# Patient Record
Sex: FEMALE | Race: WHITE | NOT HISPANIC OR LATINO | Employment: OTHER | URBAN - METROPOLITAN AREA
[De-identification: names, ages, dates, MRNs, and addresses within clinical notes are randomized per-mention and may not be internally consistent; named-entity substitution may affect disease eponyms.]

---

## 2017-01-26 ENCOUNTER — HISTORICAL (OUTPATIENT)
Dept: ANESTHESIOLOGY | Facility: HOSPITAL | Age: 67
End: 2017-01-26

## 2017-02-03 ENCOUNTER — HISTORICAL (OUTPATIENT)
Dept: RADIOLOGY | Facility: HOSPITAL | Age: 67
End: 2017-02-03

## 2017-09-20 ENCOUNTER — HISTORICAL (OUTPATIENT)
Dept: ADMINISTRATIVE | Facility: HOSPITAL | Age: 67
End: 2017-09-20

## 2018-02-28 ENCOUNTER — HISTORICAL (OUTPATIENT)
Dept: LAB | Facility: HOSPITAL | Age: 68
End: 2018-02-28

## 2018-02-28 LAB
ALBUMIN SERPL-MCNC: 4.3 GM/DL (ref 3.4–5)
ALBUMIN/GLOB SERPL: 1 {RATIO}
ALP SERPL-CCNC: 75 UNIT/L (ref 45–117)
ALT SERPL-CCNC: 61 UNIT/L (ref 13–56)
AST SERPL-CCNC: 71 UNIT/L (ref 15–37)
BILIRUB SERPL-MCNC: 0.3 MG/DL (ref 0.2–1)
BILIRUBIN DIRECT+TOT PNL SERPL-MCNC: 0.1 MG/DL (ref 0–0.2)
BILIRUBIN DIRECT+TOT PNL SERPL-MCNC: 0.2 MG/DL (ref 0–1)
BUN SERPL-MCNC: 10 MG/DL (ref 7–18)
CALCIUM SERPL-MCNC: 9.2 MG/DL (ref 8.5–10.1)
CHLORIDE SERPL-SCNC: 106 MMOL/L (ref 98–107)
CHOLEST SERPL-MCNC: 275 MG/DL (ref 0–200)
CHOLEST/HDLC SERPL: 4.2 {RATIO} (ref 0–4)
CO2 SERPL-SCNC: 29 MMOL/L (ref 21–32)
CREAT SERPL-MCNC: 0.74 MG/DL (ref 0.55–1.02)
GLOBULIN SER-MCNC: 4 GM/DL (ref 2–4)
GLUCOSE SERPL-MCNC: 83 MG/DL (ref 74–106)
HDLC SERPL-MCNC: 66 MG/DL (ref 35–60)
LDLC SERPL CALC-MCNC: 181 MG/DL (ref 0–129)
POTASSIUM SERPL-SCNC: 4.4 MMOL/L (ref 3.5–5.1)
PROT SERPL-MCNC: 8 GM/DL (ref 6.4–8.2)
SODIUM SERPL-SCNC: 141 MMOL/L (ref 136–145)
TRIGL SERPL-MCNC: 142 MG/DL (ref 30–150)
VLDLC SERPL CALC-MCNC: 28 MG/DL

## 2018-04-18 ENCOUNTER — HISTORICAL (OUTPATIENT)
Dept: ADMINISTRATIVE | Facility: HOSPITAL | Age: 68
End: 2018-04-18

## 2018-04-18 LAB
INFLUENZA A ANTIGEN, POC: NEGATIVE
RAPID GROUP A STREP (OHS): NEGATIVE

## 2019-03-20 ENCOUNTER — HISTORICAL (OUTPATIENT)
Dept: LAB | Facility: HOSPITAL | Age: 69
End: 2019-03-20

## 2019-03-20 LAB
ABS NEUT (OLG): 4.57 X10(3)/MCL (ref 2.1–9.2)
ALBUMIN SERPL-MCNC: 4.7 GM/DL (ref 3.4–5)
ALBUMIN/GLOB SERPL: 1.2 {RATIO}
ALP SERPL-CCNC: 70 UNIT/L (ref 45–117)
ALT SERPL-CCNC: 36 UNIT/L (ref 13–56)
AST SERPL-CCNC: 34 UNIT/L (ref 15–37)
BILIRUB SERPL-MCNC: 0.4 MG/DL (ref 0.2–1)
BILIRUBIN DIRECT+TOT PNL SERPL-MCNC: 0.1 MG/DL (ref 0–0.2)
BILIRUBIN DIRECT+TOT PNL SERPL-MCNC: 0.3 MG/DL (ref 0–1)
BUN SERPL-MCNC: 18 MG/DL (ref 7–18)
CALCIUM SERPL-MCNC: 9.7 MG/DL (ref 8.5–10.1)
CHLORIDE SERPL-SCNC: 104 MMOL/L (ref 98–107)
CO2 SERPL-SCNC: 30 MMOL/L (ref 21–32)
CREAT SERPL-MCNC: 0.87 MG/DL (ref 0.55–1.02)
ERYTHROCYTE [DISTWIDTH] IN BLOOD BY AUTOMATED COUNT: 13.6 % (ref 11.5–17)
GLOBULIN SER-MCNC: 4 GM/DL (ref 2–4)
GLUCOSE SERPL-MCNC: 116 MG/DL (ref 74–106)
HCT VFR BLD AUTO: 48.4 % (ref 37–47)
HGB BLD-MCNC: 15.5 GM/DL (ref 12–16)
MCH RBC QN AUTO: 32.7 PG (ref 27–31)
MCHC RBC AUTO-ENTMCNC: 32 GM/DL (ref 33–36)
MCV RBC AUTO: 102.1 FL (ref 80–94)
PLATELET # BLD AUTO: 263 X10(3)/MCL (ref 130–400)
PMV BLD AUTO: 9.8 FL (ref 7.4–10.4)
POTASSIUM SERPL-SCNC: 4.5 MMOL/L (ref 3.5–5.1)
PROT SERPL-MCNC: 8.5 GM/DL (ref 6.4–8.2)
RBC # BLD AUTO: 4.74 X10(6)/MCL (ref 4.2–5.4)
SODIUM SERPL-SCNC: 140 MMOL/L (ref 136–145)
WBC # SPEC AUTO: 7.7 X10(3)/MCL (ref 4.5–11.5)

## 2019-04-26 ENCOUNTER — HISTORICAL (OUTPATIENT)
Dept: RADIATION THERAPY | Facility: HOSPITAL | Age: 69
End: 2019-04-26

## 2019-05-08 ENCOUNTER — HISTORICAL (OUTPATIENT)
Dept: RADIATION THERAPY | Facility: HOSPITAL | Age: 69
End: 2019-05-08

## 2019-05-13 ENCOUNTER — HISTORICAL (OUTPATIENT)
Dept: RADIATION THERAPY | Facility: HOSPITAL | Age: 69
End: 2019-05-13

## 2019-05-15 ENCOUNTER — HISTORICAL (OUTPATIENT)
Dept: RADIATION THERAPY | Facility: HOSPITAL | Age: 69
End: 2019-05-15

## 2019-05-16 ENCOUNTER — HISTORICAL (OUTPATIENT)
Dept: RADIATION THERAPY | Facility: HOSPITAL | Age: 69
End: 2019-05-16

## 2019-05-17 ENCOUNTER — HISTORICAL (OUTPATIENT)
Dept: RADIATION THERAPY | Facility: HOSPITAL | Age: 69
End: 2019-05-17

## 2019-05-20 ENCOUNTER — HISTORICAL (OUTPATIENT)
Dept: RADIATION THERAPY | Facility: HOSPITAL | Age: 69
End: 2019-05-20

## 2019-05-21 ENCOUNTER — HISTORICAL (OUTPATIENT)
Dept: RADIATION THERAPY | Facility: HOSPITAL | Age: 69
End: 2019-05-21

## 2019-05-22 ENCOUNTER — HISTORICAL (OUTPATIENT)
Dept: RADIATION THERAPY | Facility: HOSPITAL | Age: 69
End: 2019-05-22

## 2019-05-23 ENCOUNTER — HISTORICAL (OUTPATIENT)
Dept: RADIATION THERAPY | Facility: HOSPITAL | Age: 69
End: 2019-05-23

## 2019-05-24 ENCOUNTER — HISTORICAL (OUTPATIENT)
Dept: RADIATION THERAPY | Facility: HOSPITAL | Age: 69
End: 2019-05-24

## 2019-05-28 ENCOUNTER — HISTORICAL (OUTPATIENT)
Dept: RADIATION THERAPY | Facility: HOSPITAL | Age: 69
End: 2019-05-28

## 2019-05-29 ENCOUNTER — HISTORICAL (OUTPATIENT)
Dept: RADIATION THERAPY | Facility: HOSPITAL | Age: 69
End: 2019-05-29

## 2019-05-30 ENCOUNTER — HISTORICAL (OUTPATIENT)
Dept: RADIATION THERAPY | Facility: HOSPITAL | Age: 69
End: 2019-05-30

## 2019-05-31 ENCOUNTER — HISTORICAL (OUTPATIENT)
Dept: RADIATION THERAPY | Facility: HOSPITAL | Age: 69
End: 2019-05-31

## 2019-06-03 ENCOUNTER — HISTORICAL (OUTPATIENT)
Dept: RADIATION THERAPY | Facility: HOSPITAL | Age: 69
End: 2019-06-03

## 2019-06-04 ENCOUNTER — HISTORICAL (OUTPATIENT)
Dept: RADIATION THERAPY | Facility: HOSPITAL | Age: 69
End: 2019-06-04

## 2019-06-05 ENCOUNTER — HISTORICAL (OUTPATIENT)
Dept: RADIATION THERAPY | Facility: HOSPITAL | Age: 69
End: 2019-06-05

## 2019-06-06 ENCOUNTER — HISTORICAL (OUTPATIENT)
Dept: RADIATION THERAPY | Facility: HOSPITAL | Age: 69
End: 2019-06-06

## 2019-06-07 ENCOUNTER — HISTORICAL (OUTPATIENT)
Dept: RADIATION THERAPY | Facility: HOSPITAL | Age: 69
End: 2019-06-07

## 2019-06-10 ENCOUNTER — HISTORICAL (OUTPATIENT)
Dept: RADIATION THERAPY | Facility: HOSPITAL | Age: 69
End: 2019-06-10

## 2019-06-11 ENCOUNTER — HISTORICAL (OUTPATIENT)
Dept: RADIATION THERAPY | Facility: HOSPITAL | Age: 69
End: 2019-06-11

## 2019-06-12 ENCOUNTER — HISTORICAL (OUTPATIENT)
Dept: RADIATION THERAPY | Facility: HOSPITAL | Age: 69
End: 2019-06-12

## 2019-09-17 ENCOUNTER — HISTORICAL (OUTPATIENT)
Dept: RADIATION THERAPY | Facility: HOSPITAL | Age: 69
End: 2019-09-17

## 2019-09-23 ENCOUNTER — HISTORICAL (OUTPATIENT)
Dept: ADMINISTRATIVE | Facility: HOSPITAL | Age: 69
End: 2019-09-23

## 2019-09-23 LAB
ABS NEUT (OLG): 2.65 X10(3)/MCL (ref 2.1–9.2)
ALBUMIN SERPL-MCNC: 3.9 GM/DL (ref 3.4–5)
ALP SERPL-CCNC: 68 UNIT/L (ref 38–126)
ALT SERPL-CCNC: 27 UNIT/L (ref 12–78)
ANION GAP SERPL CALC-SCNC: 13 MMOL/L
AST SERPL-CCNC: 29 UNIT/L (ref 15–37)
BASOPHILS # BLD AUTO: 0 X10(3)/MCL (ref 0–0.2)
BASOPHILS NFR BLD AUTO: 0.2 %
BILIRUB SERPL-MCNC: 0.3 MG/DL (ref 0.2–1)
BILIRUBIN DIRECT+TOT PNL SERPL-MCNC: 0.1 MG/DL (ref 0–0.5)
BILIRUBIN DIRECT+TOT PNL SERPL-MCNC: 0.2 MG/DL (ref 0–0.8)
BUN SERPL-MCNC: 17 MG/DL (ref 8–26)
CHLORIDE SERPL-SCNC: 107 MMOL/L (ref 98–109)
CREAT SERPL-MCNC: 0.8 MG/DL (ref 0.6–1.3)
EOSINOPHIL # BLD AUTO: 0.1 X10(3)/MCL (ref 0–0.9)
EOSINOPHIL NFR BLD AUTO: 1.5 %
ERYTHROCYTE [DISTWIDTH] IN BLOOD BY AUTOMATED COUNT: 13.5 % (ref 11.5–17)
GLUCOSE SERPL-MCNC: 100 MG/DL (ref 70–105)
HCT VFR BLD AUTO: 44.6 % (ref 37–47)
HCT VFR BLD CALC: 43 % (ref 38–51)
HGB BLD-MCNC: 13.9 GM/DL (ref 12–16)
HGB BLD-MCNC: 14.6 MG/DL (ref 12–17)
LIVER PROFILE INTERP: NORMAL
LYMPHOCYTES # BLD AUTO: 1.6 X10(3)/MCL (ref 0.6–4.6)
LYMPHOCYTES NFR BLD AUTO: 34.9 %
MCH RBC QN AUTO: 32 PG (ref 27–31)
MCHC RBC AUTO-ENTMCNC: 31.2 GM/DL (ref 33–36)
MCV RBC AUTO: 102.5 FL (ref 80–94)
MONOCYTES # BLD AUTO: 0.3 X10(3)/MCL (ref 0.1–1.3)
MONOCYTES NFR BLD AUTO: 6.4 %
NEUTROPHILS # BLD AUTO: 2.6 X10(3)/MCL (ref 2.1–9.2)
NEUTROPHILS NFR BLD AUTO: 56.8 %
PLATELET # BLD AUTO: 213 X10(3)/MCL (ref 130–400)
PMV BLD AUTO: 9.9 FL (ref 9.4–12.4)
POC IONIZED CALCIUM: 1.12 MMOL/L (ref 1.12–1.32)
POC TCO2: 25 MMOL/L (ref 24–29)
POTASSIUM BLD-SCNC: 3.9 MMOL/L (ref 3.5–4.9)
PROT SERPL-MCNC: 6.8 GM/DL (ref 6.4–8.2)
RBC # BLD AUTO: 4.35 X10(6)/MCL (ref 4.2–5.4)
SODIUM BLD-SCNC: 141 MMOL/L (ref 138–146)
WBC # SPEC AUTO: 4.7 X10(3)/MCL (ref 4.5–11.5)

## 2020-01-06 ENCOUNTER — HISTORICAL (OUTPATIENT)
Dept: ADMINISTRATIVE | Facility: HOSPITAL | Age: 70
End: 2020-01-06

## 2020-01-06 LAB
ABS NEUT (OLG): 3.25 X10(3)/MCL (ref 2.1–9.2)
ALBUMIN SERPL-MCNC: 4 GM/DL (ref 3.4–5)
ALP SERPL-CCNC: 80 UNIT/L (ref 38–126)
ALT SERPL-CCNC: 27 UNIT/L (ref 12–78)
ANION GAP SERPL CALC-SCNC: 13 MMOL/L
AST SERPL-CCNC: 21 UNIT/L (ref 15–37)
BASOPHILS # BLD AUTO: 0 X10(3)/MCL (ref 0–0.2)
BASOPHILS NFR BLD AUTO: 0.2 %
BILIRUB SERPL-MCNC: 0.6 MG/DL (ref 0.2–1)
BILIRUBIN DIRECT+TOT PNL SERPL-MCNC: 0.1 MG/DL (ref 0–0.2)
BILIRUBIN DIRECT+TOT PNL SERPL-MCNC: 0.5 MG/DL (ref 0–0.8)
BUN SERPL-MCNC: 18 MG/DL (ref 8–26)
CHLORIDE SERPL-SCNC: 104 MMOL/L (ref 98–109)
CREAT SERPL-MCNC: 0.9 MG/DL (ref 0.6–1.3)
EOSINOPHIL # BLD AUTO: 0.1 X10(3)/MCL (ref 0–0.9)
EOSINOPHIL NFR BLD AUTO: 1.8 %
ERYTHROCYTE [DISTWIDTH] IN BLOOD BY AUTOMATED COUNT: 13 % (ref 11.5–17)
GLUCOSE SERPL-MCNC: 69 MG/DL (ref 70–105)
HCT VFR BLD AUTO: 45.6 % (ref 37–47)
HCT VFR BLD CALC: 44 % (ref 38–51)
HGB BLD-MCNC: 14.3 GM/DL (ref 12–16)
HGB BLD-MCNC: 15 MG/DL (ref 12–17)
LIVER PROFILE INTERP: NORMAL
LYMPHOCYTES # BLD AUTO: 1.7 X10(3)/MCL (ref 0.6–4.6)
LYMPHOCYTES NFR BLD AUTO: 30.7 %
MCH RBC QN AUTO: 31.8 PG (ref 27–31)
MCHC RBC AUTO-ENTMCNC: 31.4 GM/DL (ref 33–36)
MCV RBC AUTO: 101.6 FL (ref 80–94)
MONOCYTES # BLD AUTO: 0.4 X10(3)/MCL (ref 0.1–1.3)
MONOCYTES NFR BLD AUTO: 7.8 %
NEUTROPHILS # BLD AUTO: 3.2 X10(3)/MCL (ref 2.1–9.2)
NEUTROPHILS NFR BLD AUTO: 59.3 %
PLATELET # BLD AUTO: 260 X10(3)/MCL (ref 130–400)
PMV BLD AUTO: 9.4 FL (ref 9.4–12.4)
POC IONIZED CALCIUM: 1.12 MMOL/L (ref 1.12–1.32)
POC TCO2: 29 MMOL/L (ref 24–29)
POTASSIUM BLD-SCNC: 4.2 MMOL/L (ref 3.5–4.9)
PROT SERPL-MCNC: 7 GM/DL (ref 6.4–8.2)
RBC # BLD AUTO: 4.49 X10(6)/MCL (ref 4.2–5.4)
SODIUM BLD-SCNC: 140 MMOL/L (ref 138–146)
WBC # SPEC AUTO: 5.5 X10(3)/MCL (ref 4.5–11.5)

## 2020-05-15 ENCOUNTER — HISTORICAL (OUTPATIENT)
Dept: LAB | Facility: HOSPITAL | Age: 70
End: 2020-05-15

## 2020-05-15 LAB
ALBUMIN SERPL-MCNC: 4.2 GM/DL (ref 3.4–4.8)
ALP SERPL-CCNC: 89 UNIT/L (ref 40–150)
ALT SERPL-CCNC: 26 UNIT/L (ref 0–55)
AST SERPL-CCNC: 27 UNIT/L (ref 5–34)
BILIRUB SERPL-MCNC: 0.6 MG/DL (ref 0.2–1.2)
BILIRUBIN DIRECT+TOT PNL SERPL-MCNC: 0.2 MG/DL (ref 0–0.5)
BILIRUBIN DIRECT+TOT PNL SERPL-MCNC: 0.4 MG/DL (ref 0–0.8)
PROT SERPL-MCNC: 7.9 GM/DL (ref 5.8–7.6)

## 2020-07-08 ENCOUNTER — HISTORICAL (OUTPATIENT)
Dept: ADMINISTRATIVE | Facility: HOSPITAL | Age: 70
End: 2020-07-08

## 2020-07-08 LAB
ABS NEUT (OLG): 2.25 X10(3)/MCL (ref 2.1–9.2)
ALBUMIN SERPL-MCNC: 4.4 GM/DL (ref 3.4–4.8)
ALP SERPL-CCNC: 73 UNIT/L (ref 40–150)
ALT SERPL-CCNC: 19 UNIT/L (ref 0–55)
ANION GAP SERPL CALC-SCNC: 13 MMOL/L
AST SERPL-CCNC: 25 UNIT/L (ref 5–34)
BILIRUB SERPL-MCNC: 0.3 MG/DL
BILIRUBIN DIRECT+TOT PNL SERPL-MCNC: 0.1 MG/DL (ref 0–0.5)
BILIRUBIN DIRECT+TOT PNL SERPL-MCNC: 0.2 MG/DL (ref 0–0.8)
BUN SERPL-MCNC: 21 MG/DL (ref 8–26)
CHLORIDE SERPL-SCNC: 105 MMOL/L (ref 98–109)
CREAT SERPL-MCNC: 0.5 MG/DL (ref 0.6–1.3)
EOSINOPHIL # BLD AUTO: 0.1 X10(3)/MCL (ref 0–0.9)
EOSINOPHIL NFR BLD AUTO: 2.6 %
ERYTHROCYTE [DISTWIDTH] IN BLOOD BY AUTOMATED COUNT: 12.2 % (ref 11.5–17)
GLUCOSE SERPL-MCNC: 96 MG/DL (ref 70–105)
HCT VFR BLD AUTO: 46.4 % (ref 37–47)
HCT VFR BLD CALC: 46 % (ref 38–51)
HGB BLD-MCNC: 14.7 GM/DL (ref 12–16)
HGB BLD-MCNC: 15.6 MG/DL (ref 12–17)
LIVER PROFILE INTERP: ABNORMAL
LYMPHOCYTES # BLD AUTO: 1.9 X10(3)/MCL (ref 0.6–4.6)
LYMPHOCYTES NFR BLD AUTO: 40.8 %
MCH RBC QN AUTO: 31.7 PG (ref 27–31)
MCHC RBC AUTO-ENTMCNC: 31.7 GM/DL (ref 33–36)
MCV RBC AUTO: 100 FL (ref 80–94)
MONOCYTES # BLD AUTO: 0.3 X10(3)/MCL (ref 0.1–1.3)
MONOCYTES NFR BLD AUTO: 7 %
NEUTROPHILS # BLD AUTO: 2.2 X10(3)/MCL (ref 2.1–9.2)
NEUTROPHILS NFR BLD AUTO: 49.2 %
PLATELET # BLD AUTO: 207 X10(3)/MCL (ref 130–400)
PMV BLD AUTO: 9.7 FL (ref 9.4–12.4)
POC IONIZED CALCIUM: 1.15 MMOL/L (ref 1.12–1.32)
POC TCO2: 27 MMOL/L (ref 24–29)
POTASSIUM BLD-SCNC: 4.3 MMOL/L (ref 3.5–4.9)
PROT SERPL-MCNC: 7.8 GM/DL (ref 5.8–7.6)
RBC # BLD AUTO: 4.64 X10(6)/MCL (ref 4.2–5.4)
SODIUM BLD-SCNC: 140 MMOL/L (ref 138–146)
WBC # SPEC AUTO: 4.6 X10(3)/MCL (ref 4.5–11.5)

## 2020-10-12 ENCOUNTER — HISTORICAL (OUTPATIENT)
Dept: ADMINISTRATIVE | Facility: HOSPITAL | Age: 70
End: 2020-10-12

## 2020-10-12 LAB
ABS NEUT (OLG): 1.92 X10(3)/MCL (ref 2.1–9.2)
ALBUMIN SERPL-MCNC: 4.7 GM/DL (ref 3.4–4.8)
ALBUMIN/GLOB SERPL: 1.7 RATIO (ref 1.1–2)
ALP SERPL-CCNC: 72 UNIT/L (ref 40–150)
ALT SERPL-CCNC: 25 UNIT/L (ref 0–55)
AST SERPL-CCNC: 50 UNIT/L (ref 5–34)
BASOPHILS # BLD AUTO: 0 X10(3)/MCL (ref 0–0.2)
BASOPHILS NFR BLD AUTO: 0.2 %
BILIRUB SERPL-MCNC: 0.6 MG/DL
BILIRUBIN DIRECT+TOT PNL SERPL-MCNC: 0.2 MG/DL (ref 0–0.5)
BILIRUBIN DIRECT+TOT PNL SERPL-MCNC: 0.4 MG/DL (ref 0–0.8)
BUN SERPL-MCNC: 18.5 MG/DL (ref 9.8–20.1)
CALCIUM SERPL-MCNC: 9 MG/DL (ref 8.4–10.2)
CHLORIDE SERPL-SCNC: 107 MMOL/L (ref 98–107)
CO2 SERPL-SCNC: 23 MMOL/L (ref 23–31)
CREAT SERPL-MCNC: 0.72 MG/DL (ref 0.55–1.02)
EOSINOPHIL # BLD AUTO: 0.1 X10(3)/MCL (ref 0–0.9)
EOSINOPHIL NFR BLD AUTO: 1.8 %
ERYTHROCYTE [DISTWIDTH] IN BLOOD BY AUTOMATED COUNT: 13.2 % (ref 11.5–17)
GLOBULIN SER-MCNC: 2.8 GM/DL (ref 2.4–3.5)
GLUCOSE SERPL-MCNC: 80 MG/DL (ref 82–115)
HCT VFR BLD AUTO: 45.4 % (ref 37–47)
HGB BLD-MCNC: 14.8 GM/DL (ref 12–16)
LYMPHOCYTES # BLD AUTO: 2.1 X10(3)/MCL (ref 0.6–4.6)
LYMPHOCYTES NFR BLD AUTO: 47.5 %
MCH RBC QN AUTO: 32.4 PG (ref 27–31)
MCHC RBC AUTO-ENTMCNC: 32.6 GM/DL (ref 33–36)
MCV RBC AUTO: 99.3 FL (ref 80–94)
MONOCYTES # BLD AUTO: 0.3 X10(3)/MCL (ref 0.1–1.3)
MONOCYTES NFR BLD AUTO: 7.4 %
NEUTROPHILS # BLD AUTO: 1.9 X10(3)/MCL (ref 2.1–9.2)
NEUTROPHILS NFR BLD AUTO: 43.1 %
PLATELET # BLD AUTO: 123 X10(3)/MCL (ref 130–400)
PMV BLD AUTO: 11 FL (ref 9.4–12.4)
POTASSIUM SERPL-SCNC: 4.6 MMOL/L (ref 3.5–5.1)
PROT SERPL-MCNC: 7.5 GM/DL (ref 5.8–7.6)
RBC # BLD AUTO: 4.57 X10(6)/MCL (ref 4.2–5.4)
SODIUM SERPL-SCNC: 143 MMOL/L (ref 136–145)
WBC # SPEC AUTO: 4.5 X10(3)/MCL (ref 4.5–11.5)

## 2021-03-18 ENCOUNTER — HISTORICAL (OUTPATIENT)
Dept: LAB | Facility: HOSPITAL | Age: 71
End: 2021-03-18

## 2021-03-18 LAB
ALBUMIN SERPL-MCNC: 4.7 GM/DL (ref 3.4–4.8)
ALBUMIN/GLOB SERPL: 1.4 RATIO (ref 1.1–2)
ALP SERPL-CCNC: 86 UNIT/L (ref 40–150)
ALT SERPL-CCNC: 22 UNIT/L (ref 0–55)
AST SERPL-CCNC: 26 UNIT/L (ref 5–34)
BILIRUB SERPL-MCNC: 0.5 MG/DL (ref 0.2–1.2)
BILIRUBIN DIRECT+TOT PNL SERPL-MCNC: 0.2 MG/DL (ref 0–0.5)
BILIRUBIN DIRECT+TOT PNL SERPL-MCNC: 0.3 MG/DL (ref 0–0.8)
BUN SERPL-MCNC: 19.2 MG/DL (ref 9.8–20.1)
CALCIUM SERPL-MCNC: 9.7 MG/DL (ref 8.4–10.2)
CHLORIDE SERPL-SCNC: 104 MMOL/L (ref 98–107)
CHOLEST SERPL-MCNC: 276 MG/DL
CHOLEST/HDLC SERPL: 4 {RATIO} (ref 0–5)
CO2 SERPL-SCNC: 27 MMOL/L (ref 23–31)
CREAT SERPL-MCNC: 0.97 MG/DL (ref 0.57–1.11)
GLOBULIN SER-MCNC: 3.4 GM/DL (ref 2.4–3.5)
GLUCOSE SERPL-MCNC: 104 MG/DL (ref 82–115)
HDLC SERPL-MCNC: 65 MG/DL (ref 40–60)
LDLC SERPL CALC-MCNC: 185 MG/DL (ref 50–140)
POTASSIUM SERPL-SCNC: 4.4 MMOL/L (ref 3.5–5.1)
PROT SERPL-MCNC: 8.1 GM/DL (ref 5.8–7.6)
SODIUM SERPL-SCNC: 139 MMOL/L (ref 136–145)
TRIGL SERPL-MCNC: 130 MG/DL (ref 0–150)
VLDLC SERPL CALC-MCNC: 26 MG/DL

## 2021-06-25 ENCOUNTER — HISTORICAL (OUTPATIENT)
Dept: LAB | Facility: HOSPITAL | Age: 71
End: 2021-06-25

## 2021-06-25 LAB
ALBUMIN SERPL-MCNC: 4.1 GM/DL (ref 3.4–4.8)
ALBUMIN/GLOB SERPL: 1.3 RATIO (ref 1.1–2)
ALP SERPL-CCNC: 67 UNIT/L (ref 40–150)
ALT SERPL-CCNC: 66 UNIT/L (ref 0–55)
AST SERPL-CCNC: 56 UNIT/L (ref 5–34)
BILIRUB SERPL-MCNC: 0.3 MG/DL (ref 0.2–1.2)
BILIRUBIN DIRECT+TOT PNL SERPL-MCNC: 0.1 MG/DL (ref 0–0.5)
BILIRUBIN DIRECT+TOT PNL SERPL-MCNC: 0.2 MG/DL (ref 0–0.8)
BUN SERPL-MCNC: 22.2 MG/DL (ref 9.8–20.1)
CALCIUM SERPL-MCNC: 9.6 MG/DL (ref 8.4–10.2)
CHLORIDE SERPL-SCNC: 106 MMOL/L (ref 98–107)
CHOLEST SERPL-MCNC: 219 MG/DL
CHOLEST/HDLC SERPL: 4 {RATIO} (ref 0–5)
CO2 SERPL-SCNC: 30 MMOL/L (ref 23–31)
CREAT SERPL-MCNC: 0.79 MG/DL (ref 0.57–1.11)
GLOBULIN SER-MCNC: 3.1 GM/DL (ref 2.4–3.5)
GLUCOSE SERPL-MCNC: 89 MG/DL (ref 82–115)
HDLC SERPL-MCNC: 50 MG/DL (ref 40–60)
LDLC SERPL CALC-MCNC: 147 MG/DL (ref 50–140)
POTASSIUM SERPL-SCNC: 4.8 MMOL/L (ref 3.5–5.1)
PROT SERPL-MCNC: 7.2 GM/DL (ref 5.8–7.6)
SODIUM SERPL-SCNC: 142 MMOL/L (ref 136–145)
TRIGL SERPL-MCNC: 108 MG/DL (ref 0–150)
VLDLC SERPL CALC-MCNC: 22 MG/DL

## 2022-04-09 ENCOUNTER — HISTORICAL (OUTPATIENT)
Dept: ADMINISTRATIVE | Facility: HOSPITAL | Age: 72
End: 2022-04-09

## 2022-04-29 ENCOUNTER — HISTORICAL (OUTPATIENT)
Dept: LAB | Facility: HOSPITAL | Age: 72
End: 2022-04-29

## 2022-04-29 VITALS
WEIGHT: 117.94 LBS | SYSTOLIC BLOOD PRESSURE: 146 MMHG | SYSTOLIC BLOOD PRESSURE: 117 MMHG | WEIGHT: 130.94 LBS | BODY MASS INDEX: 23.2 KG/M2 | DIASTOLIC BLOOD PRESSURE: 72 MMHG | HEIGHT: 63 IN | OXYGEN SATURATION: 95 % | BODY MASS INDEX: 20.9 KG/M2 | OXYGEN SATURATION: 96 % | HEIGHT: 63 IN | DIASTOLIC BLOOD PRESSURE: 89 MMHG

## 2022-04-29 LAB
ABS NEUT (OLG): 2.95 (ref 2.1–9.2)
ALBUMIN SERPL-MCNC: 4.5 G/DL (ref 3.4–4.8)
ALBUMIN/GLOB SERPL: 1.2 {RATIO} (ref 1.1–2)
ALP SERPL-CCNC: 92 U/L (ref 40–150)
ALT SERPL-CCNC: 39 U/L (ref 0–55)
AST SERPL-CCNC: 33 U/L (ref 5–34)
BILIRUB SERPL-MCNC: 0.4 MG/DL (ref 0.2–1.2)
BILIRUBIN DIRECT+TOT PNL SERPL-MCNC: 0.2 (ref 0–0.5)
BILIRUBIN DIRECT+TOT PNL SERPL-MCNC: 0.2 (ref 0–0.8)
BUN SERPL-MCNC: 14.8 MG/DL (ref 9.8–20.1)
C3 SERPL-MCNC: 151 MG/DL (ref 80–173)
C4 SERPL-MCNC: 18.8 MG/DL (ref 13–46)
CALCIUM SERPL-MCNC: 10.1 MG/DL (ref 8.4–10.2)
CHLORIDE SERPL-SCNC: 104 MMOL/L (ref 98–107)
CO2 SERPL-SCNC: 28 MMOL/L (ref 23–31)
CREAT SERPL-MCNC: 0.77 MG/DL (ref 0.57–1.11)
ERYTHROCYTE [DISTWIDTH] IN BLOOD BY AUTOMATED COUNT: 13.8 % (ref 11.5–17)
GLOBULIN SER-MCNC: 3.8 G/DL (ref 2.4–3.5)
GLUCOSE SERPL-MCNC: 94 MG/DL (ref 82–115)
HCT VFR BLD AUTO: 48 % (ref 37–47)
HEMOLYSIS INTERF INDEX SERPL-ACNC: 4
HGB BLD-MCNC: 15.7 G/DL (ref 12–16)
ICTERIC INTERF INDEX SERPL-ACNC: 0
LIPEMIC INTERF INDEX SERPL-ACNC: 2
MCH RBC QN AUTO: 32.2 PG (ref 27–31)
MCHC RBC AUTO-ENTMCNC: 32.7 G/DL (ref 33–36)
MCV RBC AUTO: 98.4 FL (ref 80–94)
NRBC BLD AUTO-RTO: 0 % (ref 0–0.2)
PLATELET # BLD AUTO: 319 10*3/UL (ref 130–400)
PMV BLD AUTO: 9.4 FL (ref 7.4–10.4)
POTASSIUM SERPL-SCNC: 4.9 MMOL/L (ref 3.5–5.1)
PROT SERPL-MCNC: 8.3 G/DL (ref 5.8–7.6)
RBC # BLD AUTO: 4.88 10*6/UL (ref 4.2–5.4)
RHEUMATOID FACT SERPL-ACNC: <13 [IU]/ML
SODIUM SERPL-SCNC: 141 MMOL/L (ref 136–145)
WBC # SPEC AUTO: 5.5 10*3/UL (ref 4.5–11.5)

## 2022-04-30 LAB
ANTINUCLEAR ANTIBODY SCREEN (OHS): NEGATIVE
CENTROMERE PROTEIN ANTIBODY (OHS): NEGATIVE
CENTROMERE QUANT (OHS): <0.4
DSDNA AB QUANT (OHS): 1.5
DSDNA ANTIBODY (OHS): NEGATIVE
JO-1 AB QUANT (OHS): <0.3
JO-1 ANTIBODY (OHS): NEGATIVE
RNP70 AB QUANT (OHS): 0.3
RNP70 ANTIBODY (OHS): NEGATIVE
SCL-70S AB QUANT (OHS): <0.6
SCLERODERMA (SCL-70S) ANTIBODY (OHS): NEGATIVE
SMITH AB QUANT (OHS): 1.6
SMITH DP IGG (OHS): NEGATIVE
SSA(RO) AB QUANT (OHS): 0.4
SSA(RO) ANTIBODY (OHS): NEGATIVE
SSB(LA) AB QUANT (OHS): 0.3
SSB(LA) ANTIBODY (OHS): NEGATIVE
U1RNP AB QUANT (OHS): 0.7
U1RNP ANTIBODY (OHS): NEGATIVE

## 2022-04-30 NOTE — PROGRESS NOTES
Patient:   Padmini Da Silva            MRN: [id number]            FIN: [id number]               Age:   69 years     Sex:  Female     :  1950   Associated Diagnoses:   Malignant neoplasm of upper-outer quadrant of left female breast   Author:   Delicia Cordoba      Surgeon: Dr. Hansel Franz    Left Breast Cancer Stage IA ([id number])--diagnosed 19  Biopsy/pathology: Left breast 7:00 core biopsy done 19--invasive ductal carcinoma, well-differentiated, grade I measuring at least 5mm, no lymphovascular invasion, DCIS low grade w/o calcifications or necrosis, ER 98.8%, IA 97.4%, Her2 0-1+ by IHC negative, non-amplified by FISH, Ki67 3.8%.  Surgery/pathology: Left breast lumpectomy with SLN biopsy done 19--invasive carcinoma, tubular type, grade I, unifocal, 8mm, separate focus of DCIS, cribriform type, nuclear grade I, 1mm greatest dimension, no lymphovascular invasion, closest margin invasive carcinoma 5mm, DCIS margin 1mm, 1 SLN negative.  Imagin. Screening MMG [facility] 19--asymmetries left breast 15mm and 11mm  require further evaluation, BIRADS 0.  2. Angel MMG Left breast/Left breast US [facility] 19--irregular spiculated masses 1:00 left breast--measure 5mm and 3mm, benign appearing lymph nodes left axilla, highly suspicious BIRADS 5.  3. Bilateral Breast MRI w/ and w/o contrast [facility] 3/19/19--Left breast with [id number] mass 1:00 posterior left breast, 3mm focus with suspicious kinetics seen anterolateral to the margin of the dominant mass, 8X8X3.5mm ovoid mass with suspicious kinetics, fairly well circumscribed, could represent involvement of an intramammary lymph node, 8mm left level I lymph node with rounded morphology and wash out kinetics, no suspicious foci or masses or abnormal lymph nodes in right breast, MR findings suspicious for multicentric disease, satellite focus immediately adjacent to known invasive ductal  carcinoma, ovoid mass posterior left breast, abnormal intramammary lymph node vs. multicentric disease, at least 2 abnormal axillary nodes identified.    Treatment History:   1. Adjuvant Radiation x 20 treatments. 5/8/19 - 6/12/19.     Current Treatment:  1. Refused to take AI or Tamoxifen--Observation      Visit Information   Visit type:  Scheduled follow-up.    Accompanied by:  No one.       Chief Complaint   10/12/2020 11:01 CDT     No c/o        Interval History   Current complaint: The patient presents for scheduled follow-up of her breast cancer. She is doing well. She had previously met with Dr. Perry and they had planned for surgery for her left breast scar tissue/fat necrosis however this was post-poned due to COVID-19 and now is delayed since the patient is still smoking. States she was started on a new medication by Dr. Marcelo for her RA called Cimzia which is an injection. Labs today show mild thrombocytopenia with platelets of 123K which is new for her. She denies any recent infections. She is also on a new GI medication. All other medications remain the same. Bilateral MMG at [facility] on 6/16/20 was Category 3 and she is scheduled for left breast only on 12/8/20. Otherwise doing well without any complaints.      Review of Systems   Constitutional:  No fever, No chills, No weakness, No fatigue.    Eye:  No recent visual problem.    Ear/Nose/Mouth/Throat:  No decreased hearing, No nasal congestion, No sore throat.    Respiratory:  No shortness of breath, No cough, No wheezing.    Cardiovascular:  No chest pain, No palpitations, No peripheral edema.    Breast:  s/p left breast lumpectomy, +scar tissue left breast.    Gastrointestinal:  No nausea, No vomiting, No diarrhea, No constipation, No heartburn.    Hematology/Lymphatics:  No bruising tendency, No bleeding tendency.    Musculoskeletal:  Neck pain, Joint pain, RA.         Back pain: chronic back.    Integumentary:  No skin lesion.    Neurologic:  Alert  and oriented X4, No confusion, No headache.    Psychiatric:  Anxiety, Depression.    All other systems are negative      Health Status   Allergies:    Allergic Reactions (Selected)  Severity Not Documented  Sulfa drugs- Unknown.   Current medications:  (Selected)   Prescriptions  Prescribed  Advair Diskus 100 mcg-50 mcg inhalation powder: 1 inh, INH, BID, # 28 EA, 0 Refill(s), Pharmacy: [facility]  Nebulizer Machine: Nebulizer Machine, See Instructions, Dispense 1 Nebulizer Machine to be used with Albuterol Inhaled solution every 4-6hours PRN sob/wheezing., # 1 EA, 0 Refill(s)  ProAir HFA 90 mcg/inh inhalation aerosol with adapter: 2 puff(s), INH, QID, PRN PRN as needed for wheezing, # 1 EA, 0 Refill(s), Pharmacy: [facility]  albuterol 2.5 mg/3 mL (0.083%) inhalation solution: 2.5 mg = 3 mL, NEB, q6hr, PRN PRN as needed for wheezing, # 75 EA, 1 Refill(s), Pharmacy: [facility]  Documented Medications  Documented  Lyrica 200 mg oral capsule: 200 mg = 1 cap(s), Oral, Daily, 0 Refill(s)  SYNTHROID    TAB 100MC mcg = 1 tab(s), Oral, Daily  acetaminophen-hydrocodone 325 mg-10 mg oral tablet: 1 tab(s), Oral, 5x/Day, PRN for pain, 0 Refill(s)  clonazePAM 1 mg oral tablet: 1 mg = 1 tab(s), Oral, qPM  methotrexate 2.5 mg oral tablet: 20 mg = 8 tab(s), Oral, qWeek, # 20 tab(s), 0 Refill(s)  mometasone 0.1% topical cream: TOP  oseltamivir 75 mg oral capsule: 75 mg = 1 cap(s), Oral, BID      Histories   Past Medical History:    Active  Chronic pain (338.2)  Arthritis (716.90)   Family History:    CAD - Coronary artery disease  Mother  Sister  CHF (congestive heart failure).  Father  Heart disease.  Sister  Cardiac arrhythmia.  Brother     Procedure history:    neck sx in  at 60 Years.  neck sx in  at 57 Years.  neck sx in  at 56 Years.  Cholecystectomy ([id number]) in 2006 at 56 Years.  Hysterectomy (034471871) in  at 48 Years.  Tonsillectomy (292912806) in  at  24 Years.  Lumpectomy-L breast.   Social History     Alcohol  Frequency: 1-2 times per year    Employment/School  Status: Disablility, Retired    Home/Environment  Lives with: Alone    Substance Use  Use: Never    Tobacco  Use: 4 or less cigarettes   .        Physical Examination   Vital Signs   10/12/2020 11:01 CDT     Temperature Oral          36.6 DegC                             Temperature Oral (calculated)             97.88 DegF                             Peripheral Pulse Rate     72 bpm                             SpO2                      95 %                             Systolic Blood Pressure   123 mmHg                             Diastolic Blood Pressure  67 mmHg     General:  Alert and oriented, No acute distress, Pleasant,  female.    Eye:  Pupils are equal, round and reactive to light, Normal conjunctiva, Vision unchanged.    HENT:  Normocephalic, Normal hearing, Oral mucosa is moist.    Neck:  Supple, Non-tender, No lymphadenopathy.    Respiratory:  Lungs are clear to auscultation, Respirations are non-labored, Symmetrical chest wall expansion.    Cardiovascular:  Normal rate, Regular rhythm, Normal peripheral perfusion, No edema.    Breast:  Left breast with axillary tail lumpectomy incision, healed well. Bilateral breast implants in place, no masses, no axillary adenopathy, left breast implant more firm c/w increased scar tissue ?encapsulation.    Gastrointestinal:  Soft, Non-tender, Non-distended, Normal bowel sounds.    Genitourinary:  No costovertebral angle tenderness.    Lymphatics:  No lymphadenopathy neck, axilla, groin.    Musculoskeletal:  Normal range of motion, Normal strength, No deformity, Normal gait.    Integumentary:  Warm, Dry, Intact, No rash.    Neurologic:  Alert, Oriented.    Cognition and Speech:  Oriented, Speech clear and coherent, Functional cognition intact.    Psychiatric:  Cooperative, Appropriate mood & affect, Normal judgment.    ECOG Performance Scale: 0 -  Fully active; no performance restrictions.      Review / Management   Results review:  Lab results   10/12/2020 10:26 CDT     WBC                       4.5 x10(3)/mcL                             RBC                       4.57 x10(6)/mcL                             Hgb                       14.8 gm/dL                             Hct                       45.4 %                             Platelet                  123 x10(3)/mcL  LOW                             MCV                       99.3 fL  HI                             MCH                       32.4 pg  HI                             MCHC                      32.6 gm/dL  LOW                             RDW                       13.2 %                             MPV                       11.0 fL                             Abs Neut                  1.92 x10(3)/mcL  LOW                             NEUT%                     43.1 %  NA                             NEUT#                     1.9 x10(3)/mcL  LOW                             LYMPH%                    47.5 %  NA                             LYMPH#                    2.1 x10(3)/mcL                             MONO%                     7.4 %  NA                             MONO#                     0.3 x10(3)/mcL                             EOS%                      1.8 %  NA                             EOS#                      0.1 x10(3)/mcL                             BASO%                     0.2 %  NA                             BASO#                     0.0 x10(3)/mcL  .       Impression and Plan   Diagnosis     Malignant neoplasm of upper-outer quadrant of left female breast ([id number] C50.412).     Plan:  Patient with stage IA left breast cancer s/p lumpectomy done 4/1/19. Tumor measured 8mm, tubular carcinoma, favorable histology, strongly ER and ME positive and Her2 negative, lymph node negative.  Discussed MRI findings with Dr. Franz and the area of the suspicious intramammary lymph node was  assessed surgically and felt to be the SLN, reactive to the biopsy. The other axillary node was normal by US and clinically normal at time of surgery.    Per NCCN guidelines, should consider treatment with endocrine therapy.  No chemotherapy recommended due to favorable histology and no lymph node involvement.  Completed adjuvant XRT x 20 treatments on 6/12/19.   Patient refused AI and Tamoxifen due to potential side effects.    Currently patient is doing well without any signs or symptoms to suggest disease recurrence.  Labs today with platelet count of 123,000 which is new for her. Denies any recent infections/illness. She is taking a new GI medication but she cannot remember the name and is also on new injection for RA - Cimzia. Will have to inquire to see if this can cause thrombocytopenia.   She has upcoming labs scheduled with Dr. Marcelo in a few weeks - will request a copy to review.   Bilateral JACOB MMG at [facility] on 6/16/20 showed Category 3 findings with recommendations to repeat left breast in December 2020. Scheduled for 12/8/20.   Plan for breast revision has been put on hold by Dr. Perry since she is still smoking.   Continue surveillance visits every 3 months.     All questions answered at this time.        Delicia Marr, SOLEDAD

## 2022-04-30 NOTE — PROGRESS NOTES
Patient:   Padmini Da Silva            MRN: [id number]            FIN: [id number]               Age:   68 years     Sex:  Female     :  1950   Associated Diagnoses:   Malignant neoplasm of upper-outer quadrant of left female breast   Author:   Delicia Cordoba      Referring physician: Dr. Hansel Franz  Reason for Referral: Breast Cancer    Left Breast Cancer Stage IA ([id number])--diagnosed 19  Biopsy/pathology: Left breast 7:00 core biopsy done 19--invasive ductal carcinoma, well-differentiated, grade I measuring at least 5mm, no lymphovascular invasion, DCIS low grade w/o calcifications or necrosis, ER 98.8%, MD 97.4%, Her2 0-1+ by IHC negative, non-amplified by FISH, Ki67 3.8%.  Surgery/pathology: Left breast lumpectomy with SLN biopsy done 19--invasive carcinoma, tubular type, grade I, unifocal, 8mm, separate focus of DCIS, cribriform type, nuclear grade I, 1mm greatest dimension, no lymphovascular invasion, closest margin invasive carcinoma 5mm, DCIS margin 1mm, 1 SLN negative.  Imagin. Screening MMG [facility] 19--asymmetries left breast 15mm and 11mm  require further evaluation, BIRADS 0.  2. Angel MMG Left breast/Left breast US [facility] 19--irregular spiculated masses 1:00 left breast--measure 5mm and 3mm, benign appearing lymph nodes left axilla, highly suspicious BIRADS 5.  3. Bilateral Breast MRI w/ and w/o contrast [facility] 3/19/19--Left breast with [id number] mass 1:00 posterior left breast, 3mm focus with suspicious kinetics seen anterolateral to the margin of the dominant mass, 8X8X3.5mm ovoid mass with suspicious kinetics, fairly well circumscribed, could represent involvement of an intramammary lymph node, 8mm left level I lymph node with rounded morphology and wash out kinetics, no suspicious foci or masses or abnormal lymph nodes in right breast, MR findings suspicious for multicentric disease, satellite focus  immediately adjacent to known invasive ductal carcinoma, ovoid mass posterior left breast, abnormal intramammary lymph node vs. multicentric disease, at least 2 abnormal axillary nodes identified.    Treatment History:   1. Adjuvant Radiation x 20 treatments. 5/8/19 - 6/12/19.     Current Treatment:  1. Refuses to take an AI. Considering starting Tamoxifen.       Visit Information   Visit type:  Scheduled follow-up.    Accompanied by:  No one.       Chief Complaint   9/23/2019 12:57 CDT      No c/o        Interval History   Current complaint: The patient presents for scheduled follow-up of her breast cancer. She is doing okay. She states her heartburn continues but is much better. Still closely followed by her GI physician. Patient is currently under pain managment for chronic neck and back pain, s/p multiple surgeries. She is also followed by Dr. Marcelo for her RA and takes Methotrexate. She is refusing to start an AI after reading about the side effects. States she already has bone/joint aches and does not want to take anything that could worsen her symptoms. I mentioned Tamoxifen and she was given information on the medication. She will consider starting but wants to read about it first. Otherwise no complaints. No other problems reported.      Review of Systems   Constitutional:  No fever, No chills, No weakness, No fatigue.    Eye:  No recent visual problem.    Ear/Nose/Mouth/Throat:  No decreased hearing, No nasal congestion, No sore throat.    Respiratory:  No shortness of breath, No cough, No wheezing.    Cardiovascular:  No chest pain, No palpitations, No peripheral edema.    Breast:  s/p left breast lumpectomy.    Gastrointestinal:  Heartburn, No nausea, No vomiting, No diarrhea, No constipation.    Hematology/Lymphatics:  No bruising tendency, No bleeding tendency.    Musculoskeletal:  Neck pain, No joint pain.         Back pain: chronic back.    Integumentary:  No skin lesion.    Neurologic:  Alert and  oriented X4, No confusion, No headache.    Psychiatric:  Anxiety, Depression.    All other systems are negative      Health Status   Allergies:    Allergic Reactions (Selected)  Severity Not Documented  Sulfa drugs- Unknown.   Current medications:  (Selected)   Prescriptions  Prescribed  Advair Diskus 100 mcg-50 mcg inhalation powder: 1 inh, INH, BID, # 28 EA, 0 Refill(s), Pharmacy: [facility]  Nebulizer Machine: Nebulizer Machine, See Instructions, Dispense 1 Nebulizer Machine to be used with Albuterol Inhaled solution every 4-6hours PRN sob/wheezing., # 1 EA, 0 Refill(s)  ProAir HFA 90 mcg/inh inhalation aerosol with adapter: 2 puff(s), INH, QID, PRN PRN as needed for wheezing, # 1 EA, 0 Refill(s), Pharmacy: [facility]  ProAir HFA 90 mcg/inh inhalation aerosol with adapter: 2 puff(s), INH, q4hr, PRN PRN for wheezing, # 1 EA, 0 Refill(s), Pharmacy: [facility]  albuterol 2.5 mg/3 mL (0.083%) inhalation solution: 2.5 mg = 3 mL, NEB, q6hr, PRN PRN as needed for wheezing, # 75 EA, 1 Refill(s), Pharmacy: [facility]  Documented Medications  Documented  DIAZepam 5 mg oral tablet:   Lyrica 200 mg oral capsule: 200 mg = 1 cap(s), Oral, Daily, 0 Refill(s)  Ryvent 6 mg oral tablet: 6 mg = 1 tab(s), Oral, BID  SYNTHROID    TAB 100MC mcg = 1 tab(s), Oral, Daily  Synthroid 100 mcg (0.1mg) Inj: Daily, 0 Refill(s)  acetaminophen-hydrocodone 325 mg-10 mg oral tablet: 1 tab(s), Oral, 5x/Day, PRN for pain, 0 Refill(s)  clonazePAM 1 mg oral tablet: 1 mg = 1 tab(s), Oral, qPM  hydroquinone 4% topical cream:   methotrexate 2.5 mg oral tablet: 20 mg = 8 tab(s), Oral, qWeek, # 20 tab(s), 0 Refill(s)  mometasone 0.1% topical cream: TOP  oseltamivir 75 mg oral capsule: 75 mg = 1 cap(s), Oral, BID      Histories   Past Medical History:    Active  Chronic pain (338.2)  Arthritis (716.90)   Family History:    CAD - Coronary artery disease  Mother  Sister  CHF (congestive heart  failure).  Father  Heart disease.  Sister  Cardiac arrhythmia.  Brother     Procedure history:    neck sx in 2010 at 60 Years.  neck sx in 2007 at 57 Years.  neck sx in 2006 at 56 Years.  Cholecystectomy ([id number]) in 2006 at 56 Years.  Hysterectomy (156583940) in 1998 at 48 Years.  Tonsillectomy (658271637) in 1974 at 24 Years.  Lumpectomy-L breast.   Social History     Alcohol  Frequency: 1-2 times per year    Employment/School  Status: Disablility, Retired    Home/Environment  Lives with: Alone    Substance Use  Use: Never    Tobacco  Use: 4 or less cigarettes   .        Physical Examination   Vital Signs   9/23/2019 12:57 CDT      Temperature Oral          36.8 DegC                             Temperature Oral (calculated)             98.24 DegF                             Peripheral Pulse Rate     69 bpm                             SpO2                      96 %                             Systolic Blood Pressure   117 mmHg                             Diastolic Blood Pressure  82 mmHg     General:  Alert and oriented, No acute distress, pleasant white female.    Eye:  Pupils are equal, round and reactive to light, Normal conjunctiva, Vision unchanged.    HENT:  Normocephalic, Normal hearing, Oral mucosa is moist.    Neck:  Supple, Non-tender, No jugular venous distention, No lymphadenopathy, No thyromegaly.    Respiratory:  Lungs are clear to auscultation, Respirations are non-labored, Breath sounds are equal.    Cardiovascular:  Normal rate, Regular rhythm, No murmur, No gallop, Normal peripheral perfusion, No edema.    Gastrointestinal:  Soft, Non-tender, Non-distended, Normal bowel sounds, No organomegaly.    Lymphatics:  No lymphadenopathy neck, axilla, groin.    Musculoskeletal:  Normal range of motion, Normal strength, No deformity, Normal gait.    Integumentary:  Warm, Dry, Intact.    Neurologic:  Alert, Oriented, Normal sensory, Normal motor function.    Psychiatric:  Cooperative, Appropriate mood &  affect.    Breast:  Lft breast with axillary tail lumpectomy incision, healed well. Bilateral breast implants in place, no masses, no axillary adenopathy.    Cognition and Speech:  Oriented, Speech clear and coherent.    ECOG Performance Scale: 0 - Fully active; no performance restrictions.      Review / Management   Results review:  Lab results   9/23/2019 12:38 CDT      POC TCO2                  25.0 mmol/L                             POC Hb                    14.6 mg/dL                             POC Hct                   43.0 %                             POC Sodium                141 mmol/L                             POC Potassium             3.9 mmol/L                             POC Chloride              107 mmol/L                             POC Ion Calcium           1.12 mmol/L                             POC Glucose               100 mg/dL                             POC BUN                   17.0 mg/dL                             POC Creatinine            0.8 mg/dL                             POC AGAP                  13.0  NA    9/23/2019 12:25 CDT      WBC                       4.7 x10(3)/mcL                             RBC                       4.35 x10(6)/mcL                             Hgb                       13.9 gm/dL                             Hct                       44.6 %                             Platelet                  213 x10(3)/mcL                             MCV                       102.5 fL  HI                             MCH                       32.0 pg  HI                             MCHC                      31.2 gm/dL  LOW                             RDW                       13.5 %                             MPV                       9.9 fL                             Abs Neut                  2.65 x10(3)/mcL                             NEUT%                     56.8 %  NA                             NEUT#                     2.6 x10(3)/mcL                              LYMPH%                    34.9 %  NA                             LYMPH#                    1.6 x10(3)/mcL                             MONO%                     6.4 %  NA                             MONO#                     0.3 x10(3)/mcL                             EOS%                      1.5 %  NA                             EOS#                      0.1 x10(3)/mcL                             BASO%                     0.2 %  NA                             BASO#                     0.0 x10(3)/mcL  .       Impression and Plan   Diagnosis     Malignant neoplasm of upper-outer quadrant of left female breast ([id number] C50.412).     Plan:  Patient with stage IA left breast cancer s/p lumpectomy done 4/1/19. Tumor measured 8mm, tubular carcinoma, favorable histology, strongly ER and RI positive and Her2 negative, lymph node negative.  Discussed MRI findings with Dr. Franz and the area of the suspicious intramammary lymph node was assessed surgically and felt to be the SLN, reactive to the biopsy. The other axillary node was normal by US and clinically normal at time of surgery.    Per NCCN guidelines, should consider treatment with endocrine therapy.  No chemotherapy recommended due to favorable histology and no lymph node involvement.  Completed adjuvant XRT x 20 treatments on 6/12/19.     She refuses to start an AI due to possible worsening of her already chronic bone/joint problems.   I spent time discussing Tamoxifen with her and she was given information on the medication. She will review and get back to us on her decision. No history of DVT or PE. She has had a total hysterectomy in the past.   Refused referral to survivorship program   Post-lumpectomy left breast MMG scheduled for December at [facility].   Continue surveillance visits every 3 months.     All questions answered at this time.        SOLEDAD Stephens

## 2022-04-30 NOTE — PROGRESS NOTES
Patient:   Padmini Da Silva            MRN: [id number]            FIN: [id number]               Age:   69 years     Sex:  Female     :  1950   Associated Diagnoses:   Malignant neoplasm of upper-outer quadrant of left female breast   Author:   Veronica Luque MD      Surgeon: Dr. Hansel Franz    Left Breast Cancer Stage IA ([id number])--diagnosed 19  Biopsy/pathology: Left breast 7:00 core biopsy done 19--invasive ductal carcinoma, well-differentiated, grade I measuring at least 5mm, no lymphovascular invasion, DCIS low grade w/o calcifications or necrosis, ER 98.8%, WI 97.4%, Her2 0-1+ by IHC negative, non-amplified by FISH, Ki67 3.8%.  Surgery/pathology: Left breast lumpectomy with SLN biopsy done 19--invasive carcinoma, tubular type, grade I, unifocal, 8mm, separate focus of DCIS, cribriform type, nuclear grade I, 1mm greatest dimension, no lymphovascular invasion, closest margin invasive carcinoma 5mm, DCIS margin 1mm, 1 SLN negative.  Imagin. Screening MMG Located within [facility] 19--asymmetries left breast 15mm and 11mm  require further evaluation, BIRADS 0.  2. Angel MMG Left breast/Left breast US Located within [facility] 19--irregular spiculated masses 1:00 left breast--measure 5mm and 3mm, benign appearing lymph nodes left axilla, highly suspicious BIRADS 5.  3. Bilateral Breast MRI w/ and w/o contrast Located within [facility] 3/19/19--Left breast with [id number] mass 1:00 posterior left breast, 3mm focus with suspicious kinetics seen anterolateral to the margin of the dominant mass, 8X8X3.5mm ovoid mass with suspicious kinetics, fairly well circumscribed, could represent involvement of an intramammary lymph node, 8mm left level I lymph node with rounded morphology and wash out kinetics, no suspicious foci or masses or abnormal lymph nodes in right breast, MR findings suspicious for multicentric disease, satellite focus immediately adjacent to known invasive ductal carcinoma,  ovoid mass posterior left breast, abnormal intramammary lymph node vs. multicentric disease, at least 2 abnormal axillary nodes identified.    Treatment History:   1. Adjuvant Radiation x 20 treatments. 5/8/19 - 6/12/19.     Current Treatment:  1. Refused to take AI or Tamoxifen--Observation      Visit Information   Visit type:  Scheduled follow-up.    Accompanied by:  No one.       Chief Complaint   1/6/2020 13:06 CST       No C/O        Interval History   Current complaint: The patient presents for scheduled follow-up of her breast cancer. She is doing okay but has had some increased scar tissue and tenderness in left breast. She will see Dr. Perry soon to see what her options for surgery may be since she has existing implants. She continues with joint pains from RA. She did research side effects of Tamoxifen and she does not want to take anything. Recent MMG left breast good.      Review of Systems   Constitutional:  No fever, No chills, No weakness, No fatigue.    Eye:  No recent visual problem.    Ear/Nose/Mouth/Throat:  No decreased hearing, No nasal congestion, No sore throat.    Respiratory:  No shortness of breath, No cough, No wheezing.    Cardiovascular:  No chest pain, No palpitations, No peripheral edema.    Breast:  s/p left breast lumpectomy, +scar tissue left breast.    Gastrointestinal:  No nausea, No vomiting, No diarrhea, No constipation, No heartburn.    Hematology/Lymphatics:  No bruising tendency, No bleeding tendency.    Musculoskeletal:  Neck pain, No joint pain.         Back pain: chronic back.    Integumentary:  No skin lesion.    Neurologic:  Alert and oriented X4, No confusion, No headache.    Psychiatric:  Anxiety, Depression.    All other systems are negative      Health Status   Allergies:    Allergies (1) Active Reaction  sulfa drugs Unknown     Current medications:  (Selected)   Prescriptions  Prescribed  Advair Diskus 100 mcg-50 mcg inhalation powder: 1 inh, INH, BID, # 28 EA, 0  Refill(s), Pharmacy: [facility]  Nebulizer Machine: Nebulizer Machine, See Instructions, Dispense 1 Nebulizer Machine to be used with Albuterol Inhaled solution every 4-6hours PRN sob/wheezing., # 1 EA, 0 Refill(s)  ProAir HFA 90 mcg/inh inhalation aerosol with adapter: 2 puff(s), INH, QID, PRN PRN as needed for wheezing, # 1 EA, 0 Refill(s), Pharmacy: [facility]  albuterol 2.5 mg/3 mL (0.083%) inhalation solution: 2.5 mg = 3 mL, NEB, q6hr, PRN PRN as needed for wheezing, # 75 EA, 1 Refill(s), Pharmacy: [facility]  Documented Medications  Documented  Lyrica 200 mg oral capsule: 200 mg = 1 cap(s), Oral, Daily, 0 Refill(s)  SYNTHROID    TAB 100MC mcg = 1 tab(s), Oral, Daily  acetaminophen-hydrocodone 325 mg-10 mg oral tablet: 1 tab(s), Oral, 5x/Day, PRN for pain, 0 Refill(s)  clonazePAM 1 mg oral tablet: 1 mg = 1 tab(s), Oral, qPM  methotrexate 2.5 mg oral tablet: 20 mg = 8 tab(s), Oral, qWeek, # 20 tab(s), 0 Refill(s)  mometasone 0.1% topical cream: TOP  oseltamivir 75 mg oral capsule: 75 mg = 1 cap(s), Oral, BID   Problem list:    Active Problems (8)  Arthritis   Breast cancer   Chronic pain   Fibromyalgia   MVP (mitral valve prolapse)   Rheumatoid arthritis   Thyroid disease   Tobacco user         Histories   Past Medical History:    Active  Chronic pain (338.2)  Arthritis (716.90)   Family History:    CAD - Coronary artery disease  Mother  Sister  CHF (congestive heart failure).  Father  Heart disease.  Sister  Cardiac arrhythmia.  Brother     Procedure history:    neck sx in  at 60 Years.  neck sx in  at 57 Years.  neck sx in  at 56 Years.  Cholecystectomy (51959279) in  at 56 Years.  Hysterectomy (731822539) in  at 48 Years.  Tonsillectomy (920091281) in  at 24 Years.  Lumpectomy-L breast.   Social History        Social & Psychosocial Habits    Alcohol  2019  Frequency: 1-2 times per year    Employment/School  2019  Status:  Disablility, Retired    Home/Environment  04/24/2019  Lives with: Alone    Substance Use  04/18/2018  Use: Never    Tobacco  05/25/2019  Use: 4 or less cigarettes(less    Patient Wants Consult For Cessation Counseling No    Concerns about tobacco use in household: No    Smokeless tobacco use: Never    01/06/2020  Use: 4 or less cigarettes(less    Patient Wants Consult For Cessation Counseling No    Abuse/Neglect  01/06/2020  SHX Any signs of abuse or neglect No  .     Alcohol  Frequency: 1-2 times per year    Employment/School  Status: Disablility, Retired    Home/Environment  Lives with: Alone    Substance Use  Use: Never    Tobacco  Use: 4 or less cigarettes   .        Physical Examination      Vital Signs (last 24 hrs)_____  Last Charted___________  Temp Oral     36.7 DegC  (JAN 06 13:06)  Heart Rate Peripheral   66 bpm  (JAN 06 13:06)  SBP      H 142mmHg  (JAN 06 13:06)  DBP      83 mmHg  (JAN 06 13:06)  SpO2      97 %  (JAN 06 13:06)  Weight      54.6 kg  (JAN 06 13:06)  Height      160 cm  (JAN 06 13:06)  BMI      21.33  (JAN 06 13:06)     General:  Alert and oriented, No acute distress, pleasant white female.    Eye:  Pupils are equal, round and reactive to light, Normal conjunctiva, Vision unchanged.    HENT:  Normocephalic, Normal hearing, Oral mucosa is moist.    Neck:  Supple, Non-tender, No jugular venous distention, No lymphadenopathy, No thyromegaly.    Respiratory:  Lungs are clear to auscultation, Respirations are non-labored, Breath sounds are equal.    Cardiovascular:  Normal rate, Regular rhythm, No murmur, No gallop, Normal peripheral perfusion, No edema.    Gastrointestinal:  Soft, Non-tender, Non-distended, Normal bowel sounds, No organomegaly.    Lymphatics:  No lymphadenopathy neck, axilla, groin.    Musculoskeletal:  Normal range of motion, Normal strength, No deformity, Normal gait.    Integumentary:  Warm, Dry, Intact.    Neurologic:  Alert, Oriented, Normal sensory, Normal motor function.     Psychiatric:  Cooperative, Appropriate mood & affect.    Breast:  Left breast with axillary tail lumpectomy incision, healed well. Bilateral breast implants in place, no masses, no axillary adenopathy, left breast implant more firm c/w increased scar tissue ?encapsulation.    Cognition and Speech:  Oriented, Speech clear and coherent.    ECOG Performance Scale: 0 - Fully active; no performance restrictions.      Review / Management   Results review:  Lab results   1/6/2020 13:14 CST       POC TCO2                  29.0 mmol/L                             POC Hb                    15.0 mg/dL                             POC Hct                   44.0 %                             POC Sodium                140 mmol/L                             POC Potassium             4.2 mmol/L                             POC Chloride              104 mmol/L                             POC Ion Calcium           1.12 mmol/L                             POC Glucose               69 mg/dL  LOW                             POC BUN                   18.0 mg/dL                             POC Creatinine            0.9 mg/dL                             POC AGAP                  13.0  NA    1/6/2020 12:46 CST       WBC                       5.5 x10(3)/mcL                             RBC                       4.49 x10(6)/mcL                             Hgb                       14.3 gm/dL                             Hct                       45.6 %                             Platelet                  260 x10(3)/mcL                             MCV                       101.6 fL  HI                             MCH                       31.8 pg  HI                             MCHC                      31.4 gm/dL  LOW                             RDW                       13.0 %                             MPV                       9.4 fL                             Abs Neut                  3.25 x10(3)/mcL                             NEUT%                      59.3 %  NA                             NEUT#                     3.2 x10(3)/mcL                             LYMPH%                    30.7 %  NA                             LYMPH#                    1.7 x10(3)/mcL                             MONO%                     7.8 %  NA                             MONO#                     0.4 x10(3)/mcL                             EOS%                      1.8 %  NA                             EOS#                      0.1 x10(3)/mcL                             BASO%                     0.2 %  NA                             BASO#                     0.0 x10(3)/mcL  .       Impression and Plan   Diagnosis     Malignant neoplasm of upper-outer quadrant of left female breast ([id number] C50.412).     Plan:  Patient with stage IA left breast cancer s/p lumpectomy done 4/1/19. Tumor measured 8mm, tubular carcinoma, favorable histology, strongly ER and ME positive and Her2 negative, lymph node negative.  Discussed MRI findings with Dr. Franz and the area of the suspicious intramammary lymph node was assessed surgically and felt to be the SLN, reactive to the biopsy. The other axillary node was normal by US and clinically normal at time of surgery.    Per NCCN guidelines, should consider treatment with endocrine therapy.  No chemotherapy recommended due to favorable histology and no lymph node involvement.  Completed adjuvant XRT x 20 treatments on 6/12/19.   Patient refused AI and Tamoxifen due to potential side effects.    Currently patient is doing well without any signs or symptoms to suggest disease recurrence.  Labs today show CBCdiff normal and BMP normal. Will follow-up LFT.  Left breast adam diagnostic MMG done at [facility] 12/16/19 benign. Next bilateral adam diagnostic MMG due in 6/2020 at [facility]--ordered today.  Continue surveillance visits every 3 months. No labs since she has labs with Dr. Marcelo every other month.    All questions answered at this time.         Veronica Luque MD

## 2022-04-30 NOTE — PROGRESS NOTES
Patient:   Padmini Da Silva            MRN: [id number]            FIN: [id number]               Age:   69 years     Sex:  Female     :  1950   Associated Diagnoses:   Malignant neoplasm of upper-outer quadrant of left female breast   Author:   Delicia Marr      Surgeon: Dr. Hansel Franz    Left Breast Cancer Stage IA ([id number])--diagnosed 19  Biopsy/pathology: Left breast 7:00 core biopsy done 19--invasive ductal carcinoma, well-differentiated, grade I measuring at least 5mm, no lymphovascular invasion, DCIS low grade w/o calcifications or necrosis, ER 98.8%, [place] 97.4%, Her2 0-1+ by IHC negative, non-amplified by FISH, Ki67 3.8%.  Surgery/pathology: Left breast lumpectomy with SLN biopsy done 19--invasive carcinoma, tubular type, grade I, unifocal, 8mm, separate focus of DCIS, cribriform type, nuclear grade I, 1mm greatest dimension, no lymphovascular invasion, closest margin invasive carcinoma 5mm, DCIS margin 1mm, 1 SLN negative.  Imagin. Screening MMG [facility] 19--asymmetries left breast 15mm and 11mm  require further evaluation, BIRADS 0.  2. Angel MMG Left breast/Left breast US [facility] 19--irregular spiculated masses 1:00 left breast--measure 5mm and 3mm, benign appearing lymph nodes left axilla, highly suspicious BIRADS 5.  3. Bilateral Breast MRI w/ and w/o contrast [facility] 3/19/19--Left breast with [id number] mass 1:00 posterior left breast, 3mm focus with suspicious kinetics seen anterolateral to the margin of the dominant mass, 8X8X3.5mm ovoid mass with suspicious kinetics, fairly well circumscribed, could represent involvement of an intramammary lymph node, 8mm left level I lymph node with rounded morphology and wash out kinetics, no suspicious foci or masses or abnormal lymph nodes in right breast, MR findings suspicious for multicentric disease, satellite focus immediately adjacent to known invasive ductal carcinoma,  ovoid mass posterior left breast, abnormal intramammary lymph node vs. multicentric disease, at least 2 abnormal axillary nodes identified.    Treatment History:   1. Adjuvant Radiation x 20 treatments. 5/8/19 - 6/12/19.     Current Treatment:  1. Refused to take AI or Tamoxifen--Observation      Visit Information   Visit type:  Scheduled follow-up.    Accompanied by:  No one.       Chief Complaint   7/8/2020 13:00 CDT       No c/o        Interval History   Current complaint: The patient presents for scheduled telemedicine follow-up of her breast cancer. She is doing well. She had previously met with Dr. Perry and they had planned for surgery for her left breast scar tissue/fat necrosis. This has been post-poned due to COVID-19. She continues with joint pains from RA and is taking the MTX. Bilateral MMG at [facility] on 6/16/20 was Category 3 and they plan to repeat left breat in December 2020. Otherwise doing well without any complaints.      Review of Systems   Constitutional:  No fever, No chills, No weakness, No fatigue.    Eye:  No recent visual problem.    Ear/Nose/Mouth/Throat:  No decreased hearing, No nasal congestion, No sore throat.    Respiratory:  No shortness of breath, No cough, No wheezing.    Cardiovascular:  No chest pain, No palpitations, No peripheral edema.    Breast:  s/p left breast lumpectomy, +scar tissue left breast.    Gastrointestinal:  No nausea, No vomiting, No diarrhea, No constipation, No heartburn.    Hematology/Lymphatics:  No bruising tendency, No bleeding tendency.    Musculoskeletal:  Neck pain, No joint pain.         Back pain: chronic back.    Integumentary:  No skin lesion.    Neurologic:  Alert and oriented X4, No confusion, No headache.    Psychiatric:  Anxiety, Depression.    All other systems are negative      Health Status   Allergies:    Allergic Reactions (Selected)  Severity Not Documented  Sulfa drugs- Unknown.   Current medications:  (Selected)    Prescriptions  Prescribed  Advair Diskus 100 mcg-50 mcg inhalation powder: 1 inh, INH, BID, # 28 EA, 0 Refill(s), Pharmacy: [facility]  Nebulizer Machine: Nebulizer Machine, See Instructions, Dispense 1 Nebulizer Machine to be used with Albuterol Inhaled solution every 4-6hours PRN sob/wheezing., # 1 EA, 0 Refill(s)  ProAir HFA 90 mcg/inh inhalation aerosol with adapter: 2 puff(s), INH, QID, PRN PRN as needed for wheezing, # 1 EA, 0 Refill(s), Pharmacy: [facility]  albuterol 2.5 mg/3 mL (0.083%) inhalation solution: 2.5 mg = 3 mL, NEB, q6hr, PRN PRN as needed for wheezing, # 75 EA, 1 Refill(s), Pharmacy: [facility]  Documented Medications  Documented  Lyrica 200 mg oral capsule: 200 mg = 1 cap(s), Oral, Daily, 0 Refill(s)  SYNTHROID    TAB 100MC mcg = 1 tab(s), Oral, Daily  acetaminophen-hydrocodone 325 mg-10 mg oral tablet: 1 tab(s), Oral, 5x/Day, PRN for pain, 0 Refill(s)  clonazePAM 1 mg oral tablet: 1 mg = 1 tab(s), Oral, qPM  methotrexate 2.5 mg oral tablet: 20 mg = 8 tab(s), Oral, qWeek, # 20 tab(s), 0 Refill(s)  mometasone 0.1% topical cream: TOP  oseltamivir 75 mg oral capsule: 75 mg = 1 cap(s), Oral, BID      Histories   Past Medical History:    Active  Chronic pain (338.2)  Arthritis (716.90)   Family History:    CAD - Coronary artery disease  Mother  Sister  CHF (congestive heart failure).  Father  Heart disease.  Sister  Cardiac arrhythmia.  Brother     Procedure history:    neck sx in  at 60 Years.  neck sx in  at 57 Years.  neck sx in  at 56 Years.  Cholecystectomy (92004754) in  at 56 Years.  Hysterectomy (487770392) in  at 48 Years.  Tonsillectomy (985342527) in  at 24 Years.  Lumpectomy-L breast.   Social History     Alcohol  Frequency: 1-2 times per year    Employment/School  Status: Disablility, Retired    Home/Environment  Lives with: Alone    Substance Use  Use: Never    Tobacco  Use: 4 or less cigarettes   .         Review / Management   Results review:  Lab results   7/8/2020 12:59 CDT       POC Sodium                140 mmol/L                             POC Potassium             4.3 mmol/L                             POC Chloride              105 mmol/L                             POC Ion Calcium           1.15 mmol/L                             POC Glucose               96 mg/dL                             POC BUN                   21.0 mg/dL                             POC Creatinine            0.5 mg/dL  LOW                             POC AGAP                  13.0  NA                             POC Hb                    15.6 mg/dL                             POC Hct                   46.0 %                             POC TCO2                  27.0 mmol/L    7/8/2020 12:42 CDT       WBC                       4.6 x10(3)/mcL                             RBC                       4.64 x10(6)/mcL                             Hgb                       14.7 gm/dL                             Hct                       46.4 %                             Platelet                  207 x10(3)/mcL                             MCV                       100.0 fL  HI                             MCH                       31.7 pg  HI                             MCHC                      31.7 gm/dL  LOW                             RDW                       12.2 %                             MPV                       9.7 fL                             Abs Neut                  2.25 x10(3)/mcL                             NEUT%                     49.2 %  NA                             NEUT#                     2.2 x10(3)/mcL                             LYMPH%                    40.8 %  NA                             LYMPH#                    1.9 x10(3)/mcL                             MONO%                     7.0 %  NA                             MONO#                     0.3 x10(3)/mcL                             EOS%                       2.6 %  NA                             EOS#                      0.1 x10(3)/mcL  .       Impression and Plan   Diagnosis     Malignant neoplasm of upper-outer quadrant of left female breast ([id number] C50.412).     Plan:  Patient with stage IA left breast cancer s/p lumpectomy done 4/1/19. Tumor measured 8mm, tubular carcinoma, favorable histology, strongly ER and ID positive and Her2 negative, lymph node negative.  Discussed MRI findings with Dr. Franz and the area of the suspicious intramammary lymph node was assessed surgically and felt to be the SLN, reactive to the biopsy. The other axillary node was normal by US and clinically normal at time of surgery.    Per NCCN guidelines, should consider treatment with endocrine therapy.  No chemotherapy recommended due to favorable histology and no lymph node involvement.  Completed adjuvant XRT x 20 treatments on 6/12/19.   Patient refused AI and Tamoxifen due to potential side effects.    Currently patient is doing well without any signs or symptoms to suggest disease recurrence.  CBC and BMP today are good.   Bilateral JACOB MMG at [facility] on 6/16/20 showed Category 3 findings with recommendations to repeat left breast in December 2020. Will send orders.   Continue surveillance visits every 3 months. No labs since she has labs with Dr. Marcelo every other month.    All questions answered at this time.      This was a Telephone visit. The patient consented to the consult held via telephone. The phone call took place with myself and the patient only according to [facility] protocol. I, distant Nurse Practitioner, conducted the visit from [facility]. The patient participated in the visit at a non-[facility] location selected by the patient, identified at his/her home. I am licensed in the state where the patient stated he or she was located. The patient stated that he/she understood and accepted the privacy and security risks to their information at their location. Total time  spent on the phone was 10 minutes.      SOLEDAD Stephens

## 2022-05-02 NOTE — HISTORICAL [facility] CERNER
This is a historical note converted from [employer]. Formatting and pictures may have been removed.  Please reference [employer] for original formatting and attached multimedia. Chief Complaint  Coughing with yellow moucus; chest and nasal congestion, fever, sore throat, bilateral ear pain x 1 week  History of Present Illness  67-year-old female presents with?productive cough, chest nasal congestion, sore throat, bilateral ear pain and fever ?1 week.? Patient taking over-the-counter medication without resolution of symptoms.? Patient denies shortness of breath.? Patient stopped smoking but recently restarted.  Review of Systems  Constitutional_fever, fatigue, body aches  ENMT_no sore throat, no ear pain,+ sinus pain/congestion, + nasal congestion/drainage  Respiratory_cough, mucus production  Cardiovascular_no chest pain, no palpitations, no edema  Gastrointestinal_no nausea, vomiting, or diarrhea. No abdominal pain  Musculoskeletal_ no joint swelling  Integumentary_no skin rash or abnormal lesion  Neurologic_no headache, no dizziness, no weakness or numbness  ?  ?  Physical Exam  Vitals & Measurements  T:?36.7? ?C (Oral)? HR:?70(Peripheral)? RR:?18? BP:?146/89? SpO2:?95%?  HT:?160?cm? HT:?160?cm? HT:?160?cm? WT:?59.4?kg? WT:?59.4?kg? WT:?59.4?kg? BMI:?23.2?  General_well-developed well-nourished in no acute distress, appears mildly ill  Eye_ clear conjunctiva, eyelids normal  HENT_oropharynx mildly red, no exudate or swelling, TMs clear, + PND  Neck_full range of motion, anterior cervical lymphadenopathy  Respiratory_inspiratory/expiratory wheezing anterior-posterior to auscultation bilaterally  Post DuoNeb: Patient with increased air movement, with continued wheezing?and tight breath sounds.  Cardiovascular_regular rate and rhythm without murmurs, gallops or rubs  Integumentary_no rashes or skin lesions present  ?  Assessment/Plan  Chronic obstructive pulmonary disease with (acute) exacerbation  ?X-ray?does not show signs  of pneumonia. ?But shows?an indication that patient could have COPD diagnosis.? Smoking cessation highly recommended.? Patient should take prednisone?tablets?as directed, may use Advair Diskus daily,?may?use pro-air inhaler for rescue?acute onset shortness of breath, wheezing.? See instructions for wheezing below.  Ordered:  albuterol, 2.5 mg = 3 mL, NEB, q6hr, PRN PRN as needed for wheezing, # 75 EA, 1 Refill(s), Pharmacy: [facility] [place]  albuterol, 2 puff(s), INH, QID, PRN PRN as needed for wheezing, # 1 EA, 0 Refill(s), Pharmacy: [facility] [place]  azithromycin, = 1 packet(s), Oral, Daily, as directed on package labeling, X 5 day(s), # 6 tab(s), 0 Refill(s), Pharmacy: [facility] [place]  fluticasone-salmeterol, 1 inh, INH, BID, # 28 EA, 0 Refill(s), [facility], Nebulizer Machine, See Instructions, Dispense 1 Nebulizer Machine to be used with Albuterol Inhaled solution every 4-6hours PRN sob/wheezing., # 1 EA, 0 Refill(s), Pharmacy: [facility]  External Referral, establish PCP, IM, Family, Primary Care, [facility], See insurance, 04/18/18 11:11:00 CDT, Chronic obstructive pulmonary disease with (acute) exacerbation  Tobacco user  Wheezing  Cough  Office/Outpatient Visit Level 3 Established [id number] PC, Chronic obstructive pulmonary disease with (acute) exacerbation, 04/18/18 11:16:00 CDT  ?  Wheezing  ?For cough and wheeze.? Patient should perform nebulizer treatments?every 4-6 hours as tolerated?for cough, congestion,?wheezing, shortness of breath.  Ordered:  albuterol, 2.5 mg = 3 mL, NEB, q6hr, PRN PRN as needed for wheezing, # 75 EA, 1 Refill(s), Pharmacy: [facility] [place]  albuterol, 2 puff(s), INH, QID, PRN PRN as needed for wheezing, # 1 EA, 0 Refill(s), Pharmacy: [facility] [place]  fluticasone-salmeterol, 1 inh, INH, BID, # 28 EA, 0 Refill(s), Pharmacy: [facility] [place]  [facility] Prescription, Nebulizer  Machine, See Instructions, Dispense 1 Nebulizer Machine to be used with Albuterol Inhaled solution every 4-6hours PRN sob/wheezing., # 1 EA, 0 Refill(s), Pharmacy: [facility]  [place]  predniSONE, 40 mg = 2 tab(s), Oral, qAM, PRN PRN cough and congestion, with food, X 5 day(s), # 10 tab(s), 0 Refill(s), Pharmacy: [facility]  [place]  External Referral, establish PCP, IM, Family, Primary Care, [facility], See insurance, 04/18/18 11:11:00 CDT, Chronic obstructive pulmonary disease with (acute) exacerbation  Tobacco user  Wheezing  Cough  ?  Contact rheumatologist for refill on prednisone.. Work excuse for today and tomorrow if needed.? External referral for primary care established?ordered today.   Problem List/Past Medical History  Ongoing  Arthritis  Chronic pain  Fibromyalgia  MVP (mitral valve prolapse)  Rheumatoid arthritis  Thyroid disease  Tobacco user  Historical  No qualifying data  Procedure/Surgical History  neck sx (2010), neck sx (2007), Cholecystectomy (2006), neck sx (2006), Hysterectomy (1998), Tonsillectomy (1974).  Medications  acetaminophen-hydrocodone 325 mg-10 mg oral tablet, 1 tab(s), Oral, 5x/Day, PRN  Advair Diskus 100 mcg-50 mcg inhalation powder, 1 inh, INH, BID  albuterol 2.5 mg/3 mL (0.083%) inhalation solution, 2.5 mg= 3 mL, NEB, q6hr, PRN, 1 refills  Azithromycin 5 Day Dose Pack 250 mg oral tablet, 1 packet(s), Oral, Daily  CICLOPIROX 8% SOLUTION  CIPROFLOXACIN  MG TA, 500 mg= 1 tab(s), Oral, BID  CLONAZEPAM TAB 1MG, 1 mg= 1 tab(s), Oral, qPM  ClonAZEpam 0.5 mg oral tablet, 1 mg= 2 tab(s), Oral, At Bedtime,? ?Not Taking per Prescriber  dextromethorphan-promethazine 15 mg-6.25 mg/5 mL oral syrup, 5 mL, Oral, q6hr, PRN  DIAZEPAM TAB 5MG, 5 mg= 1 tab(s), Oral, BID,? ?Not taking  estradiol 1 mg oral tablet, 1 mg= 1 tab(s), Oral, Daily  ESZOPICLONE 2 MG TABLET, 2 mg= 1 tab(s), Oral, Once a day (at bedtime)  Lyrica 200 mg oral capsule, 200 mg= 1 cap(s), Oral,  BID  methotrexate 2.5 mg oral tablet, 20 mg= 8 tab(s), Oral, qWeek  Nebulizer Machine, See Instructions  prednisONE 10 mg oral tablet, 10 mg= 1 tab(s), Oral, BID  prednisONE 20 mg oral tablet, 40 mg= 2 tab(s), Oral, qAM, PRN  ProAir HFA 90 mcg/inh inhalation aerosol with adapter, 2 puff(s), INH, QID, PRN  ProAir HFA 90 mcg/inh inhalation aerosol with adapter, 2 puff(s), INH, q4hr, PRN  SYNTHROID TAB 100MCG, 100 mcg= 1 tab(s), Oral, Daily  Synthroid 100 mcg (0.1mg) Inj, Daily,? ?Not Taking per Prescriber  Allergies  sulfa drugs  Social History  Substance Abuse  Never, 04/18/2018  Tobacco  Current some day smoker, Cigarettes, 04/18/2018  Former smoker, Cigarettes, Stopped age 63 Years., 03/19/2016  Family History  CAD - Coronary artery disease: Mother and Sister.  CHF (congestive heart failure).: Father.  Cardiac arrhythmia.: Brother.  Heart disease.: Sister.  Lab Results  Test Name Test Result Date/Time   Influenza A POC Negative 04/18/2018 09:43 CDT   Rapid Strep POC Negative 04/18/2018 09:58 CDT   Diagnostic Results  (04/18/2018 10:53 CDT XR Chest 2 Views)  ?  Reason For Exam  Cough  ?  Radiology Report  ?  EXAM: XR Chest 2 Views  ?  INDICATION: Cough  ?  TECHNIQUE: Frontal and lateral views of the chest are obtained.  ?  COMPARISON: Prominent without images from chest x-ray on 6/8/2006  ?  FINDINGS: The cardiomediastinal silhouette is normal in size and  contour. The lungs are hyperaerated with flattening of the diaphragm.  No focal consolidation, pleural effusion or pneumothorax is  identified. ACDF is noted along the imaged lower cervical spine.  ?  ?  IMPRESSION:?  No acute cardiopulmonary process. Chronic obstructive pulmonary  changes.  ?  ?  Signature Line  Electronically Signed By: Angie Maher DO  Date/Time Signed: 04/18/2018 10:56  ?  ?  This document has an image  ?  Result type:???????  XR Chest 2 Views  Result date:???????  April 18, 2018 10:53 CDT  Result status:???????  Auth  (Verified)  Result title:???????  XR Chest 2 Views  Performed by:???????  Angie Maher DO on April 18, 2018 10:55 CDT  Verified by:???????  Angie Maher DO on April 18, 2018 10:56 CDT  Encounter info:???????  [id number], [facility], Clinic Visit, 4/18/2018 -?  ?  ?  ? [1]     [1]?XR Chest 2 Views; Angie Maher DO 04/18/2018 10:53 CDT   Patient given DuoNeb treatment in office (albuterol?2.5 mg?and Atrovent 0.5 mg),?also administered?10 mg of dexamethasone?as IM injection in?1 g of Rocephin?as IM injection while in office.

## 2022-09-20 ENCOUNTER — LAB VISIT (OUTPATIENT)
Dept: LAB | Facility: HOSPITAL | Age: 72
End: 2022-09-20
Attending: INTERNAL MEDICINE
Payer: MEDICARE

## 2022-09-20 DIAGNOSIS — I34.0 MITRAL VALVE INSUFFICIENCY, UNSPECIFIED ETIOLOGY: Primary | ICD-10-CM

## 2022-09-20 DIAGNOSIS — I65.23 BILATERAL CAROTID ARTERY OCCLUSION: ICD-10-CM

## 2022-09-20 DIAGNOSIS — Z92.3 PERSONAL HISTORY OF IRRADIATION, PRESENTING HAZARDS TO HEALTH: ICD-10-CM

## 2022-09-20 DIAGNOSIS — E78.2 MIXED HYPERLIPIDEMIA: ICD-10-CM

## 2022-09-20 DIAGNOSIS — I36.1 TRICUSPID VALVE INSUFFICIENCY, NON-RHEUMATIC: ICD-10-CM

## 2022-09-20 LAB
ALBUMIN SERPL-MCNC: 4.2 GM/DL (ref 3.4–4.8)
ALBUMIN/GLOB SERPL: 1.3 RATIO (ref 1.1–2)
ALP SERPL-CCNC: 73 UNIT/L (ref 40–150)
ALT SERPL-CCNC: 15 UNIT/L (ref 0–55)
AST SERPL-CCNC: 23 UNIT/L (ref 5–34)
BILIRUBIN DIRECT+TOT PNL SERPL-MCNC: 0.3 MG/DL
BUN SERPL-MCNC: 21 MG/DL (ref 9.8–20.1)
CALCIUM SERPL-MCNC: 9.5 MG/DL (ref 8.4–10.2)
CHLORIDE SERPL-SCNC: 108 MMOL/L (ref 98–107)
CHOLEST SERPL-MCNC: 254 MG/DL
CHOLEST/HDLC SERPL: 4 {RATIO} (ref 0–5)
CO2 SERPL-SCNC: 28 MMOL/L (ref 23–31)
CREAT SERPL-MCNC: 0.79 MG/DL (ref 0.55–1.02)
GFR SERPLBLD CREATININE-BSD FMLA CKD-EPI: >60 MLS/MIN/1.73/M2
GLOBULIN SER-MCNC: 3.3 GM/DL (ref 2.4–3.5)
GLUCOSE SERPL-MCNC: 94 MG/DL (ref 82–115)
HDLC SERPL-MCNC: 58 MG/DL (ref 35–60)
LDLC SERPL CALC-MCNC: 176 MG/DL (ref 50–140)
POTASSIUM SERPL-SCNC: 4.9 MMOL/L (ref 3.5–5.1)
PROT SERPL-MCNC: 7.5 GM/DL (ref 5.8–7.6)
SODIUM SERPL-SCNC: 142 MMOL/L (ref 136–145)
TRIGL SERPL-MCNC: 99 MG/DL (ref 37–140)
VLDLC SERPL CALC-MCNC: 20 MG/DL

## 2022-09-20 PROCEDURE — 80061 LIPID PANEL: CPT

## 2022-09-20 PROCEDURE — 36415 COLL VENOUS BLD VENIPUNCTURE: CPT

## 2022-09-20 PROCEDURE — 80053 COMPREHEN METABOLIC PANEL: CPT

## 2023-04-11 ENCOUNTER — LAB VISIT (OUTPATIENT)
Dept: LAB | Facility: HOSPITAL | Age: 73
End: 2023-04-11
Attending: INTERNAL MEDICINE
Payer: MEDICARE

## 2023-04-11 DIAGNOSIS — E78.2 MIXED HYPERLIPIDEMIA: ICD-10-CM

## 2023-04-11 DIAGNOSIS — I34.0 MITRAL VALVE INSUFFICIENCY, UNSPECIFIED ETIOLOGY: Primary | ICD-10-CM

## 2023-04-11 DIAGNOSIS — Z92.3 PERSONAL HISTORY OF IRRADIATION, PRESENTING HAZARDS TO HEALTH: ICD-10-CM

## 2023-04-11 DIAGNOSIS — I65.23 BILATERAL CAROTID ARTERY OCCLUSION: ICD-10-CM

## 2023-04-11 DIAGNOSIS — I36.1 TRICUSPID VALVE INSUFFICIENCY, NON-RHEUMATIC: ICD-10-CM

## 2023-04-11 LAB
ALBUMIN SERPL-MCNC: 4.3 G/DL (ref 3.4–4.8)
ALBUMIN/GLOB SERPL: 1.4 RATIO (ref 1.1–2)
ALP SERPL-CCNC: 71 UNIT/L (ref 40–150)
ALT SERPL-CCNC: 18 UNIT/L (ref 0–55)
AST SERPL-CCNC: 24 UNIT/L (ref 5–34)
BILIRUBIN DIRECT+TOT PNL SERPL-MCNC: 0.4 MG/DL
BUN SERPL-MCNC: 17.9 MG/DL (ref 9.8–20.1)
CALCIUM SERPL-MCNC: 10.3 MG/DL (ref 8.4–10.2)
CHLORIDE SERPL-SCNC: 106 MMOL/L (ref 98–107)
CHOLEST SERPL-MCNC: 253 MG/DL
CHOLEST/HDLC SERPL: 5 {RATIO} (ref 0–5)
CO2 SERPL-SCNC: 26 MMOL/L (ref 23–31)
CREAT SERPL-MCNC: 0.82 MG/DL (ref 0.55–1.02)
GFR SERPLBLD CREATININE-BSD FMLA CKD-EPI: >60 MLS/MIN/1.73/M2
GLOBULIN SER-MCNC: 3.1 GM/DL (ref 2.4–3.5)
GLUCOSE SERPL-MCNC: 122 MG/DL (ref 82–115)
HDLC SERPL-MCNC: 56 MG/DL (ref 35–60)
LDLC SERPL CALC-MCNC: 172 MG/DL (ref 50–140)
POTASSIUM SERPL-SCNC: 4.4 MMOL/L (ref 3.5–5.1)
PROT SERPL-MCNC: 7.4 GM/DL (ref 5.8–7.6)
SODIUM SERPL-SCNC: 140 MMOL/L (ref 136–145)
TRIGL SERPL-MCNC: 126 MG/DL (ref 37–140)
VLDLC SERPL CALC-MCNC: 25 MG/DL

## 2023-04-11 PROCEDURE — 80061 LIPID PANEL: CPT

## 2023-04-11 PROCEDURE — 36415 COLL VENOUS BLD VENIPUNCTURE: CPT

## 2023-04-11 PROCEDURE — 80053 COMPREHEN METABOLIC PANEL: CPT

## 2023-07-25 ENCOUNTER — LAB VISIT (OUTPATIENT)
Dept: LAB | Facility: HOSPITAL | Age: 73
End: 2023-07-25
Attending: INTERNAL MEDICINE
Payer: MEDICARE

## 2023-07-25 DIAGNOSIS — R74.02 NONSPECIFIC ELEVATION OF LEVELS OF TRANSAMINASE OR LACTIC ACID DEHYDROGENASE (LDH): Primary | ICD-10-CM

## 2023-07-25 DIAGNOSIS — R74.01 NONSPECIFIC ELEVATION OF LEVELS OF TRANSAMINASE OR LACTIC ACID DEHYDROGENASE (LDH): Primary | ICD-10-CM

## 2023-07-25 LAB
ALBUMIN SERPL-MCNC: 4.3 G/DL (ref 3.4–4.8)
ALBUMIN/GLOB SERPL: 1.1 RATIO (ref 1.1–2)
ALP SERPL-CCNC: 121 UNIT/L (ref 40–150)
ALT SERPL-CCNC: 19 UNIT/L (ref 0–55)
AST SERPL-CCNC: 31 UNIT/L (ref 5–34)
BILIRUBIN DIRECT+TOT PNL SERPL-MCNC: 0.5 MG/DL
BUN SERPL-MCNC: 12.2 MG/DL (ref 9.8–20.1)
CALCIUM SERPL-MCNC: 9.9 MG/DL (ref 8.4–10.2)
CHLORIDE SERPL-SCNC: 103 MMOL/L (ref 98–107)
CO2 SERPL-SCNC: 25 MMOL/L (ref 23–31)
CREAT SERPL-MCNC: 0.83 MG/DL (ref 0.55–1.02)
GFR SERPLBLD CREATININE-BSD FMLA CKD-EPI: >60 MLS/MIN/1.73/M2
GLOBULIN SER-MCNC: 3.9 GM/DL (ref 2.4–3.5)
GLUCOSE SERPL-MCNC: 93 MG/DL (ref 82–115)
POTASSIUM SERPL-SCNC: 4.1 MMOL/L (ref 3.5–5.1)
PROT SERPL-MCNC: 8.2 GM/DL (ref 5.8–7.6)
SODIUM SERPL-SCNC: 140 MMOL/L (ref 136–145)

## 2023-07-25 PROCEDURE — 36415 COLL VENOUS BLD VENIPUNCTURE: CPT

## 2023-07-25 PROCEDURE — 80053 COMPREHEN METABOLIC PANEL: CPT

## 2023-08-08 ENCOUNTER — LAB VISIT (OUTPATIENT)
Dept: LAB | Facility: HOSPITAL | Age: 73
End: 2023-08-08
Attending: INTERNAL MEDICINE
Payer: MEDICARE

## 2023-08-08 DIAGNOSIS — I34.1 MITRAL VALVE PROLAPSE: ICD-10-CM

## 2023-08-08 DIAGNOSIS — I25.118 ATHSCL HEART DISEASE OF NATIVE COR ART W OTH ANG PCTRS: Primary | ICD-10-CM

## 2023-08-08 DIAGNOSIS — I51.89 HYPERKINETIC HEART DISEASE: ICD-10-CM

## 2023-08-08 DIAGNOSIS — I36.1 TRICUSPID VALVE INSUFFICIENCY, NON-RHEUMATIC: ICD-10-CM

## 2023-08-08 DIAGNOSIS — R76.8 FALSE POSITIVE SEROLOGICAL TEST FOR SYPHILIS: ICD-10-CM

## 2023-08-08 DIAGNOSIS — E78.2 MIXED HYPERLIPIDEMIA: ICD-10-CM

## 2023-08-08 DIAGNOSIS — I34.0 MITRAL VALVE INSUFFICIENCY, UNSPECIFIED ETIOLOGY: ICD-10-CM

## 2023-08-08 DIAGNOSIS — Z92.3 PERSONAL HISTORY OF IRRADIATION, PRESENTING HAZARDS TO HEALTH: ICD-10-CM

## 2023-08-08 DIAGNOSIS — I65.23 BILATERAL CAROTID ARTERY OCCLUSION: ICD-10-CM

## 2023-08-08 LAB
ALBUMIN SERPL-MCNC: 4.3 G/DL (ref 3.4–4.8)
ALBUMIN/GLOB SERPL: 1.2 RATIO (ref 1.1–2)
ALP SERPL-CCNC: 92 UNIT/L (ref 40–150)
ALT SERPL-CCNC: 25 UNIT/L (ref 0–55)
AST SERPL-CCNC: 34 UNIT/L (ref 5–34)
BILIRUB SERPL-MCNC: 0.4 MG/DL
BUN SERPL-MCNC: 17.1 MG/DL (ref 9.8–20.1)
CALCIUM SERPL-MCNC: 9.5 MG/DL (ref 8.4–10.2)
CHLORIDE SERPL-SCNC: 106 MMOL/L (ref 98–107)
CHOLEST SERPL-MCNC: 258 MG/DL
CHOLEST/HDLC SERPL: 5 {RATIO} (ref 0–5)
CO2 SERPL-SCNC: 25 MMOL/L (ref 23–31)
CREAT SERPL-MCNC: 0.85 MG/DL (ref 0.55–1.02)
GFR SERPLBLD CREATININE-BSD FMLA CKD-EPI: >60 MLS/MIN/1.73/M2
GLOBULIN SER-MCNC: 3.6 GM/DL (ref 2.4–3.5)
GLUCOSE SERPL-MCNC: 93 MG/DL (ref 82–115)
HDLC SERPL-MCNC: 53 MG/DL (ref 35–60)
LDLC SERPL CALC-MCNC: 184 MG/DL (ref 50–140)
POTASSIUM SERPL-SCNC: 4.7 MMOL/L (ref 3.5–5.1)
PROT SERPL-MCNC: 7.9 GM/DL (ref 5.8–7.6)
SODIUM SERPL-SCNC: 141 MMOL/L (ref 136–145)
TRIGL SERPL-MCNC: 105 MG/DL (ref 37–140)
VLDLC SERPL CALC-MCNC: 21 MG/DL

## 2023-08-08 PROCEDURE — 80053 COMPREHEN METABOLIC PANEL: CPT

## 2023-08-08 PROCEDURE — 36415 COLL VENOUS BLD VENIPUNCTURE: CPT

## 2023-08-08 PROCEDURE — 80061 LIPID PANEL: CPT

## 2024-08-13 ENCOUNTER — LAB VISIT (OUTPATIENT)
Dept: LAB | Facility: HOSPITAL | Age: 74
End: 2024-08-13
Attending: INTERNAL MEDICINE
Payer: MEDICARE

## 2024-08-13 DIAGNOSIS — R60.0 LOCALIZED EDEMA: Primary | ICD-10-CM

## 2024-08-13 DIAGNOSIS — R76.8 FALSE POSITIVE SEROLOGICAL TEST FOR SYPHILIS: ICD-10-CM

## 2024-08-13 DIAGNOSIS — I34.1 MITRAL VALVE PROLAPSE: ICD-10-CM

## 2024-08-13 DIAGNOSIS — I65.23 BILATERAL CAROTID ARTERY OCCLUSION: ICD-10-CM

## 2024-08-13 DIAGNOSIS — Z92.3 PERSONAL HISTORY OF IRRADIATION, PRESENTING HAZARDS TO HEALTH: ICD-10-CM

## 2024-08-13 LAB
ALBUMIN SERPL-MCNC: 4.5 G/DL (ref 3.4–4.8)
ALBUMIN/GLOB SERPL: 1.3 RATIO (ref 1.1–2)
ALP SERPL-CCNC: 79 UNIT/L (ref 40–150)
ALT SERPL-CCNC: 20 UNIT/L (ref 0–55)
ANION GAP SERPL CALC-SCNC: 12 MEQ/L
AST SERPL-CCNC: 27 UNIT/L (ref 5–34)
BILIRUB SERPL-MCNC: 0.4 MG/DL
BUN SERPL-MCNC: 15.9 MG/DL (ref 9.8–20.1)
CALCIUM SERPL-MCNC: 10.9 MG/DL (ref 8.4–10.2)
CHLORIDE SERPL-SCNC: 105 MMOL/L (ref 98–107)
CHOLEST SERPL-MCNC: 227 MG/DL
CHOLEST/HDLC SERPL: 4 {RATIO} (ref 0–5)
CO2 SERPL-SCNC: 26 MMOL/L (ref 23–31)
CREAT SERPL-MCNC: 0.95 MG/DL (ref 0.55–1.02)
CREAT/UREA NIT SERPL: 17
GFR SERPLBLD CREATININE-BSD FMLA CKD-EPI: >60 ML/MIN/1.73/M2
GLOBULIN SER-MCNC: 3.6 GM/DL (ref 2.4–3.5)
GLUCOSE SERPL-MCNC: 101 MG/DL (ref 82–115)
HDLC SERPL-MCNC: 58 MG/DL (ref 35–60)
LDLC SERPL CALC-MCNC: 143 MG/DL (ref 50–140)
POTASSIUM SERPL-SCNC: 4.8 MMOL/L (ref 3.5–5.1)
PROT SERPL-MCNC: 8.1 GM/DL (ref 5.8–7.6)
SODIUM SERPL-SCNC: 143 MMOL/L (ref 136–145)
TRIGL SERPL-MCNC: 130 MG/DL (ref 37–140)
VLDLC SERPL CALC-MCNC: 26 MG/DL

## 2024-08-13 PROCEDURE — 80061 LIPID PANEL: CPT

## 2024-08-13 PROCEDURE — 36415 COLL VENOUS BLD VENIPUNCTURE: CPT

## 2024-08-13 PROCEDURE — 80053 COMPREHEN METABOLIC PANEL: CPT

## 2024-10-30 PROCEDURE — 77300 RADIATION THERAPY DOSE PLAN: CPT | Performed by: RADIOLOGY

## 2024-10-30 PROCEDURE — 77334 RADIATION TREATMENT AID(S): CPT | Performed by: RADIOLOGY

## 2024-10-30 PROCEDURE — 77295 3-D RADIOTHERAPY PLAN: CPT | Performed by: RADIOLOGY

## 2024-10-31 ENCOUNTER — APPOINTMENT (OUTPATIENT)
Dept: RADIATION THERAPY | Facility: HOSPITAL | Age: 74
End: 2024-10-31
Attending: RADIOLOGY
Payer: MEDICARE

## 2024-10-31 PROCEDURE — 77412 RADIATION TX DELIVERY LVL 3: CPT | Performed by: RADIOLOGY

## 2024-10-31 PROCEDURE — 77280 THER RAD SIMULAJ FIELD SMPL: CPT | Performed by: RADIOLOGY

## 2024-11-01 ENCOUNTER — CLINICAL SUPPORT (OUTPATIENT)
Dept: RADIATION THERAPY | Facility: HOSPITAL | Age: 74
End: 2024-11-01
Attending: STUDENT IN AN ORGANIZED HEALTH CARE EDUCATION/TRAINING PROGRAM
Payer: MEDICARE

## 2024-11-01 PROCEDURE — 77387 GUIDANCE FOR RADJ TX DLVR: CPT

## 2024-11-01 PROCEDURE — 77412 RADIATION TX DELIVERY LVL 3: CPT

## 2024-11-04 PROCEDURE — 77412 RADIATION TX DELIVERY LVL 3: CPT | Performed by: RADIOLOGY

## 2024-11-04 PROCEDURE — 77336 RADIATION PHYSICS CONSULT: CPT | Performed by: RADIOLOGY

## 2024-11-05 PROCEDURE — 77412 RADIATION TX DELIVERY LVL 3: CPT | Performed by: RADIOLOGY

## 2024-11-06 PROCEDURE — 77412 RADIATION TX DELIVERY LVL 3: CPT | Performed by: RADIOLOGY

## 2024-11-07 PROCEDURE — 77417 THER RADIOLOGY PORT IMAGE(S): CPT | Performed by: RADIOLOGY

## 2024-11-07 PROCEDURE — 77412 RADIATION TX DELIVERY LVL 3: CPT | Performed by: RADIOLOGY

## 2024-11-08 PROCEDURE — 77412 RADIATION TX DELIVERY LVL 3: CPT

## 2024-11-11 PROCEDURE — 77336 RADIATION PHYSICS CONSULT: CPT | Performed by: RADIOLOGY
